# Patient Record
Sex: FEMALE | Race: WHITE | Employment: OTHER | URBAN - NONMETROPOLITAN AREA
[De-identification: names, ages, dates, MRNs, and addresses within clinical notes are randomized per-mention and may not be internally consistent; named-entity substitution may affect disease eponyms.]

---

## 2017-03-16 ENCOUNTER — OFFICE VISIT (OUTPATIENT)
Age: 82
End: 2017-03-16

## 2017-03-16 VITALS
DIASTOLIC BLOOD PRESSURE: 74 MMHG | TEMPERATURE: 98.2 F | BODY MASS INDEX: 21.61 KG/M2 | SYSTOLIC BLOOD PRESSURE: 120 MMHG | WEIGHT: 137.7 LBS | OXYGEN SATURATION: 92 % | HEIGHT: 67 IN | HEART RATE: 63 BPM | RESPIRATION RATE: 16 BRPM

## 2017-03-16 DIAGNOSIS — C18.2 MALIGNANT NEOPLASM OF ASCENDING COLON (HCC): Primary | ICD-10-CM

## 2017-03-16 PROCEDURE — 4040F PNEUMOC VAC/ADMIN/RCVD: CPT | Performed by: SURGERY

## 2017-03-16 PROCEDURE — G8484 FLU IMMUNIZE NO ADMIN: HCPCS | Performed by: SURGERY

## 2017-03-16 PROCEDURE — 1090F PRES/ABSN URINE INCON ASSESS: CPT | Performed by: SURGERY

## 2017-03-16 PROCEDURE — 1036F TOBACCO NON-USER: CPT | Performed by: SURGERY

## 2017-03-16 PROCEDURE — G8427 DOCREV CUR MEDS BY ELIG CLIN: HCPCS | Performed by: SURGERY

## 2017-03-16 PROCEDURE — 99213 OFFICE O/P EST LOW 20 MIN: CPT | Performed by: SURGERY

## 2017-03-16 PROCEDURE — G8419 CALC BMI OUT NRM PARAM NOF/U: HCPCS | Performed by: SURGERY

## 2017-03-16 PROCEDURE — G8400 PT W/DXA NO RESULTS DOC: HCPCS | Performed by: SURGERY

## 2017-03-16 PROCEDURE — 1123F ACP DISCUSS/DSCN MKR DOCD: CPT | Performed by: SURGERY

## 2021-10-12 ENCOUNTER — APPOINTMENT (OUTPATIENT)
Dept: CT IMAGING | Age: 86
DRG: 689 | End: 2021-10-12
Payer: MEDICARE

## 2021-10-12 ENCOUNTER — HOSPITAL ENCOUNTER (INPATIENT)
Age: 86
LOS: 1 days | Discharge: HOME | DRG: 689 | End: 2021-10-14
Attending: PEDIATRICS | Admitting: PEDIATRICS
Payer: MEDICARE

## 2021-10-12 ENCOUNTER — APPOINTMENT (OUTPATIENT)
Dept: GENERAL RADIOLOGY | Age: 86
DRG: 689 | End: 2021-10-12
Payer: MEDICARE

## 2021-10-12 DIAGNOSIS — R40.4 EPISODE OF UNRESPONSIVENESS: Primary | ICD-10-CM

## 2021-10-12 DIAGNOSIS — N30.00 ACUTE CYSTITIS WITHOUT HEMATURIA: ICD-10-CM

## 2021-10-12 DIAGNOSIS — T45.515A WARFARIN-INDUCED COAGULOPATHY (HCC): ICD-10-CM

## 2021-10-12 DIAGNOSIS — R41.89 EPISODE OF UNRESPONSIVENESS: Primary | ICD-10-CM

## 2021-10-12 DIAGNOSIS — D68.32 WARFARIN-INDUCED COAGULOPATHY (HCC): ICD-10-CM

## 2021-10-12 LAB
ALBUMIN SERPL-MCNC: 4.1 G/DL (ref 3.5–5.1)
ALP BLD-CCNC: 89 U/L (ref 38–126)
ALT SERPL-CCNC: 13 U/L (ref 11–66)
ANION GAP SERPL CALCULATED.3IONS-SCNC: 10 MEQ/L (ref 8–16)
APTT: 45.2 SECONDS (ref 22–38)
AST SERPL-CCNC: 19 U/L (ref 5–40)
BACTERIA: ABNORMAL /HPF
BASOPHILS # BLD: 0.7 %
BASOPHILS ABSOLUTE: 0.1 THOU/MM3 (ref 0–0.1)
BILIRUB SERPL-MCNC: 0.5 MG/DL (ref 0.3–1.2)
BILIRUBIN URINE: NEGATIVE
BLOOD, URINE: ABNORMAL
BUN BLDV-MCNC: 18 MG/DL (ref 7–22)
CALCIUM SERPL-MCNC: 9.2 MG/DL (ref 8.5–10.5)
CASTS 2: ABNORMAL /LPF
CASTS UA: ABNORMAL /LPF
CHARACTER, URINE: CLEAR
CHLORIDE BLD-SCNC: 105 MEQ/L (ref 98–111)
CO2: 26 MEQ/L (ref 23–33)
COLOR: YELLOW
CREAT SERPL-MCNC: 0.9 MG/DL (ref 0.4–1.2)
CRYSTALS, UA: ABNORMAL
EKG Q-T INTERVAL: 326 MS
EKG QRS DURATION: 82 MS
EKG QTC CALCULATION (BAZETT): 416 MS
EKG R AXIS: -75 DEGREES
EKG T AXIS: 65 DEGREES
EKG VENTRICULAR RATE: 98 BPM
EOSINOPHIL # BLD: 1.5 %
EOSINOPHILS ABSOLUTE: 0.1 THOU/MM3 (ref 0–0.4)
EPITHELIAL CELLS, UA: ABNORMAL /HPF
ERYTHROCYTE [DISTWIDTH] IN BLOOD BY AUTOMATED COUNT: 15.2 % (ref 11.5–14.5)
ERYTHROCYTE [DISTWIDTH] IN BLOOD BY AUTOMATED COUNT: 55.5 FL (ref 35–45)
GFR SERPL CREATININE-BSD FRML MDRD: 60 ML/MIN/1.73M2
GLUCOSE BLD-MCNC: 105 MG/DL (ref 70–108)
GLUCOSE URINE: NEGATIVE MG/DL
HCT VFR BLD CALC: 41 % (ref 37–47)
HEMOGLOBIN: 12.9 GM/DL (ref 12–16)
IMMATURE GRANS (ABS): 0.02 THOU/MM3 (ref 0–0.07)
IMMATURE GRANULOCYTES: 0.3 %
INR BLD: 3.13 (ref 0.85–1.13)
KETONES, URINE: NEGATIVE
LEUKOCYTE ESTERASE, URINE: ABNORMAL
LYMPHOCYTES # BLD: 22.8 %
LYMPHOCYTES ABSOLUTE: 1.7 THOU/MM3 (ref 1–4.8)
MCH RBC QN AUTO: 31.3 PG (ref 26–33)
MCHC RBC AUTO-ENTMCNC: 31.5 GM/DL (ref 32.2–35.5)
MCV RBC AUTO: 99.5 FL (ref 81–99)
MISCELLANEOUS 2: ABNORMAL
MONOCYTES # BLD: 9.9 %
MONOCYTES ABSOLUTE: 0.7 THOU/MM3 (ref 0.4–1.3)
NITRITE, URINE: POSITIVE
NUCLEATED RED BLOOD CELLS: 0 /100 WBC
OSMOLALITY CALCULATION: 283.5 MOSMOL/KG (ref 275–300)
PH UA: 5.5 (ref 5–9)
PLATELET # BLD: 161 THOU/MM3 (ref 130–400)
PMV BLD AUTO: 9.8 FL (ref 9.4–12.4)
POC CREATININE WHOLE BLOOD: 0.8 MG/DL (ref 0.5–1.2)
POTASSIUM REFLEX MAGNESIUM: 4.1 MEQ/L (ref 3.5–5.2)
PROTEIN UA: NEGATIVE
RBC # BLD: 4.12 MILL/MM3 (ref 4.2–5.4)
RBC URINE: ABNORMAL /HPF
RENAL EPITHELIAL, UA: ABNORMAL
SEG NEUTROPHILS: 64.8 %
SEGMENTED NEUTROPHILS ABSOLUTE COUNT: 4.8 THOU/MM3 (ref 1.8–7.7)
SODIUM BLD-SCNC: 141 MEQ/L (ref 135–145)
SPECIFIC GRAVITY, URINE: 1.03 (ref 1–1.03)
TOTAL PROTEIN: 7 G/DL (ref 6.1–8)
TROPONIN T: < 0.01 NG/ML
UROBILINOGEN, URINE: 0.2 EU/DL (ref 0–1)
WBC # BLD: 7.4 THOU/MM3 (ref 4.8–10.8)
WBC UA: ABNORMAL /HPF
YEAST: ABNORMAL

## 2021-10-12 PROCEDURE — 70496 CT ANGIOGRAPHY HEAD: CPT

## 2021-10-12 PROCEDURE — 93010 ELECTROCARDIOGRAM REPORT: CPT | Performed by: INTERNAL MEDICINE

## 2021-10-12 PROCEDURE — 36415 COLL VENOUS BLD VENIPUNCTURE: CPT

## 2021-10-12 PROCEDURE — 81001 URINALYSIS AUTO W/SCOPE: CPT

## 2021-10-12 PROCEDURE — 85730 THROMBOPLASTIN TIME PARTIAL: CPT

## 2021-10-12 PROCEDURE — 93005 ELECTROCARDIOGRAM TRACING: CPT | Performed by: EMERGENCY MEDICINE

## 2021-10-12 PROCEDURE — 6360000002 HC RX W HCPCS: Performed by: PHYSICIAN ASSISTANT

## 2021-10-12 PROCEDURE — 70498 CT ANGIOGRAPHY NECK: CPT

## 2021-10-12 PROCEDURE — 85610 PROTHROMBIN TIME: CPT

## 2021-10-12 PROCEDURE — 99222 1ST HOSP IP/OBS MODERATE 55: CPT | Performed by: PHYSICIAN ASSISTANT

## 2021-10-12 PROCEDURE — 87086 URINE CULTURE/COLONY COUNT: CPT

## 2021-10-12 PROCEDURE — 80053 COMPREHEN METABOLIC PANEL: CPT

## 2021-10-12 PROCEDURE — 6360000004 HC RX CONTRAST MEDICATION: Performed by: EMERGENCY MEDICINE

## 2021-10-12 PROCEDURE — 71045 X-RAY EXAM CHEST 1 VIEW: CPT

## 2021-10-12 PROCEDURE — 85025 COMPLETE CBC W/AUTO DIFF WBC: CPT

## 2021-10-12 PROCEDURE — 96365 THER/PROPH/DIAG IV INF INIT: CPT

## 2021-10-12 PROCEDURE — 99285 EMERGENCY DEPT VISIT HI MDM: CPT

## 2021-10-12 PROCEDURE — 84484 ASSAY OF TROPONIN QUANT: CPT

## 2021-10-12 PROCEDURE — 87186 SC STD MICRODIL/AGAR DIL: CPT

## 2021-10-12 PROCEDURE — 87077 CULTURE AEROBIC IDENTIFY: CPT

## 2021-10-12 PROCEDURE — 2580000003 HC RX 258: Performed by: PHYSICIAN ASSISTANT

## 2021-10-12 PROCEDURE — 82565 ASSAY OF CREATININE: CPT

## 2021-10-12 PROCEDURE — 70450 CT HEAD/BRAIN W/O DYE: CPT

## 2021-10-12 RX ADMIN — IOPAMIDOL 80 ML: 755 INJECTION, SOLUTION INTRAVENOUS at 21:09

## 2021-10-12 RX ADMIN — CEFTRIAXONE SODIUM 1000 MG: 1 INJECTION, POWDER, FOR SOLUTION INTRAMUSCULAR; INTRAVENOUS at 23:55

## 2021-10-12 ASSESSMENT — ENCOUNTER SYMPTOMS
SORE THROAT: 0
NAUSEA: 0
SHORTNESS OF BREATH: 0
ABDOMINAL PAIN: 0
RHINORRHEA: 0
COUGH: 0

## 2021-10-13 PROBLEM — R41.82 AMS (ALTERED MENTAL STATUS): Status: ACTIVE | Noted: 2021-10-13

## 2021-10-13 PROBLEM — N30.01 ACUTE CYSTITIS WITH HEMATURIA: Status: ACTIVE | Noted: 2021-10-13

## 2021-10-13 LAB
ALBUMIN SERPL-MCNC: 3.9 G/DL (ref 3.5–5.1)
ALP BLD-CCNC: 85 U/L (ref 38–126)
ALT SERPL-CCNC: 15 U/L (ref 11–66)
ANION GAP SERPL CALCULATED.3IONS-SCNC: 10 MEQ/L (ref 8–16)
AST SERPL-CCNC: 22 U/L (ref 5–40)
BILIRUB SERPL-MCNC: 0.6 MG/DL (ref 0.3–1.2)
BUN BLDV-MCNC: 15 MG/DL (ref 7–22)
CALCIUM SERPL-MCNC: 9 MG/DL (ref 8.5–10.5)
CHLORIDE BLD-SCNC: 105 MEQ/L (ref 98–111)
CO2: 28 MEQ/L (ref 23–33)
CREAT SERPL-MCNC: 0.7 MG/DL (ref 0.4–1.2)
ERYTHROCYTE [DISTWIDTH] IN BLOOD BY AUTOMATED COUNT: 15 % (ref 11.5–14.5)
ERYTHROCYTE [DISTWIDTH] IN BLOOD BY AUTOMATED COUNT: 55.7 FL (ref 35–45)
GFR SERPL CREATININE-BSD FRML MDRD: 80 ML/MIN/1.73M2
GLUCOSE BLD-MCNC: 95 MG/DL (ref 70–108)
HCT VFR BLD CALC: 39.9 % (ref 37–47)
HEMOGLOBIN: 12.5 GM/DL (ref 12–16)
INR BLD: 2.85 (ref 0.85–1.13)
MCH RBC QN AUTO: 31.4 PG (ref 26–33)
MCHC RBC AUTO-ENTMCNC: 31.3 GM/DL (ref 32.2–35.5)
MCV RBC AUTO: 100.3 FL (ref 81–99)
OSMOLALITY CALCULATION: 285.6 MOSMOL/KG (ref 275–300)
PLATELET # BLD: 148 THOU/MM3 (ref 130–400)
PMV BLD AUTO: 9.9 FL (ref 9.4–12.4)
POTASSIUM REFLEX MAGNESIUM: 4.2 MEQ/L (ref 3.5–5.2)
RBC # BLD: 3.98 MILL/MM3 (ref 4.2–5.4)
SODIUM BLD-SCNC: 143 MEQ/L (ref 135–145)
TOTAL PROTEIN: 6.3 G/DL (ref 6.1–8)
WBC # BLD: 6.7 THOU/MM3 (ref 4.8–10.8)

## 2021-10-13 PROCEDURE — 97530 THERAPEUTIC ACTIVITIES: CPT

## 2021-10-13 PROCEDURE — 85027 COMPLETE CBC AUTOMATED: CPT

## 2021-10-13 PROCEDURE — 2060000000 HC ICU INTERMEDIATE R&B

## 2021-10-13 PROCEDURE — 97535 SELF CARE MNGMENT TRAINING: CPT

## 2021-10-13 PROCEDURE — 99232 SBSQ HOSP IP/OBS MODERATE 35: CPT | Performed by: PHYSICIAN ASSISTANT

## 2021-10-13 PROCEDURE — 97116 GAIT TRAINING THERAPY: CPT

## 2021-10-13 PROCEDURE — 99233 SBSQ HOSP IP/OBS HIGH 50: CPT | Performed by: INTERNAL MEDICINE

## 2021-10-13 PROCEDURE — 6370000000 HC RX 637 (ALT 250 FOR IP): Performed by: STUDENT IN AN ORGANIZED HEALTH CARE EDUCATION/TRAINING PROGRAM

## 2021-10-13 PROCEDURE — 80053 COMPREHEN METABOLIC PANEL: CPT

## 2021-10-13 PROCEDURE — 85610 PROTHROMBIN TIME: CPT

## 2021-10-13 PROCEDURE — 97165 OT EVAL LOW COMPLEX 30 MIN: CPT

## 2021-10-13 PROCEDURE — 97162 PT EVAL MOD COMPLEX 30 MIN: CPT

## 2021-10-13 PROCEDURE — 92610 EVALUATE SWALLOWING FUNCTION: CPT

## 2021-10-13 PROCEDURE — 6360000002 HC RX W HCPCS: Performed by: STUDENT IN AN ORGANIZED HEALTH CARE EDUCATION/TRAINING PROGRAM

## 2021-10-13 PROCEDURE — 36415 COLL VENOUS BLD VENIPUNCTURE: CPT

## 2021-10-13 PROCEDURE — 2580000003 HC RX 258: Performed by: STUDENT IN AN ORGANIZED HEALTH CARE EDUCATION/TRAINING PROGRAM

## 2021-10-13 PROCEDURE — 6370000000 HC RX 637 (ALT 250 FOR IP)

## 2021-10-13 RX ORDER — SODIUM CHLORIDE 9 MG/ML
25 INJECTION, SOLUTION INTRAVENOUS PRN
Status: DISCONTINUED | OUTPATIENT
Start: 2021-10-13 | End: 2021-10-14 | Stop reason: HOSPADM

## 2021-10-13 RX ORDER — ASPIRIN 81 MG/1
81 TABLET ORAL DAILY
COMMUNITY

## 2021-10-13 RX ORDER — CETIRIZINE HYDROCHLORIDE 10 MG/1
10 TABLET ORAL NIGHTLY
Status: DISCONTINUED | OUTPATIENT
Start: 2021-10-13 | End: 2021-10-14 | Stop reason: HOSPADM

## 2021-10-13 RX ORDER — DIGOXIN 125 MCG
125 TABLET ORAL DAILY
Status: DISCONTINUED | OUTPATIENT
Start: 2021-10-13 | End: 2021-10-14 | Stop reason: HOSPADM

## 2021-10-13 RX ORDER — POTASSIUM CHLORIDE 750 MG/1
10 TABLET, EXTENDED RELEASE ORAL DAILY
COMMUNITY

## 2021-10-13 RX ORDER — WARFARIN SODIUM 3 MG/1
3 TABLET ORAL
Status: COMPLETED | OUTPATIENT
Start: 2021-10-13 | End: 2021-10-13

## 2021-10-13 RX ORDER — DIGOXIN 125 MCG
125 TABLET ORAL DAILY
COMMUNITY

## 2021-10-13 RX ORDER — POLYETHYLENE GLYCOL 3350 17 G/17G
17 POWDER, FOR SOLUTION ORAL DAILY PRN
Status: DISCONTINUED | OUTPATIENT
Start: 2021-10-13 | End: 2021-10-14 | Stop reason: HOSPADM

## 2021-10-13 RX ORDER — WARFARIN SODIUM 5 MG/1
TABLET ORAL
COMMUNITY

## 2021-10-13 RX ORDER — METOPROLOL SUCCINATE 25 MG/1
25 TABLET, EXTENDED RELEASE ORAL 2 TIMES DAILY
Status: DISCONTINUED | OUTPATIENT
Start: 2021-10-13 | End: 2021-10-14 | Stop reason: HOSPADM

## 2021-10-13 RX ORDER — SODIUM CHLORIDE 0.9 % (FLUSH) 0.9 %
5-40 SYRINGE (ML) INJECTION EVERY 12 HOURS SCHEDULED
Status: DISCONTINUED | OUTPATIENT
Start: 2021-10-13 | End: 2021-10-14 | Stop reason: HOSPADM

## 2021-10-13 RX ORDER — ACETAMINOPHEN 650 MG/1
650 SUPPOSITORY RECTAL EVERY 6 HOURS PRN
Status: DISCONTINUED | OUTPATIENT
Start: 2021-10-13 | End: 2021-10-14 | Stop reason: HOSPADM

## 2021-10-13 RX ORDER — ONDANSETRON 4 MG/1
4 TABLET, ORALLY DISINTEGRATING ORAL EVERY 8 HOURS PRN
Status: DISCONTINUED | OUTPATIENT
Start: 2021-10-13 | End: 2021-10-14 | Stop reason: HOSPADM

## 2021-10-13 RX ORDER — ESTRADIOL 0.1 MG/G
1 CREAM VAGINAL DAILY
COMMUNITY

## 2021-10-13 RX ORDER — ONDANSETRON 2 MG/ML
4 INJECTION INTRAMUSCULAR; INTRAVENOUS EVERY 6 HOURS PRN
Status: DISCONTINUED | OUTPATIENT
Start: 2021-10-13 | End: 2021-10-14 | Stop reason: HOSPADM

## 2021-10-13 RX ORDER — LANOLIN ALCOHOL/MO/W.PET/CERES
3 CREAM (GRAM) TOPICAL NIGHTLY
Status: DISCONTINUED | OUTPATIENT
Start: 2021-10-14 | End: 2021-10-14 | Stop reason: HOSPADM

## 2021-10-13 RX ORDER — WARFARIN SODIUM 2.5 MG/1
2.5 TABLET ORAL DAILY
Status: DISCONTINUED | OUTPATIENT
Start: 2021-10-13 | End: 2021-10-13 | Stop reason: DRUGHIGH

## 2021-10-13 RX ORDER — OMEPRAZOLE 20 MG/1
20 CAPSULE, DELAYED RELEASE ORAL DAILY
COMMUNITY

## 2021-10-13 RX ORDER — SODIUM CHLORIDE 0.9 % (FLUSH) 0.9 %
5-40 SYRINGE (ML) INJECTION PRN
Status: DISCONTINUED | OUTPATIENT
Start: 2021-10-13 | End: 2021-10-14 | Stop reason: HOSPADM

## 2021-10-13 RX ORDER — FUROSEMIDE 20 MG/1
20 TABLET ORAL DAILY
COMMUNITY

## 2021-10-13 RX ORDER — DIAPER,BRIEF,INFANT-TODD,DISP
EACH MISCELLANEOUS DAILY
COMMUNITY

## 2021-10-13 RX ORDER — CYANOCOBALAMIN 1000 UG/ML
1000 INJECTION INTRAMUSCULAR; SUBCUTANEOUS
COMMUNITY

## 2021-10-13 RX ORDER — METOPROLOL SUCCINATE 100 MG/1
100 TABLET, EXTENDED RELEASE ORAL DAILY
Status: ON HOLD | COMMUNITY
End: 2021-10-14 | Stop reason: HOSPADM

## 2021-10-13 RX ORDER — ATORVASTATIN CALCIUM 10 MG/1
10 TABLET, FILM COATED ORAL DAILY
Status: DISCONTINUED | OUTPATIENT
Start: 2021-10-13 | End: 2021-10-14 | Stop reason: HOSPADM

## 2021-10-13 RX ORDER — ACETAMINOPHEN 325 MG/1
650 TABLET ORAL EVERY 6 HOURS PRN
Status: DISCONTINUED | OUTPATIENT
Start: 2021-10-13 | End: 2021-10-14 | Stop reason: HOSPADM

## 2021-10-13 RX ADMIN — SODIUM CHLORIDE, PRESERVATIVE FREE 10 ML: 5 INJECTION INTRAVENOUS at 09:32

## 2021-10-13 RX ADMIN — ATORVASTATIN CALCIUM 10 MG: 10 TABLET, FILM COATED ORAL at 19:30

## 2021-10-13 RX ADMIN — CEFTRIAXONE SODIUM 1000 MG: 1 INJECTION, POWDER, FOR SOLUTION INTRAMUSCULAR; INTRAVENOUS at 23:08

## 2021-10-13 RX ADMIN — METOPROLOL SUCCINATE 25 MG: 25 TABLET, FILM COATED, EXTENDED RELEASE ORAL at 09:32

## 2021-10-13 RX ADMIN — WARFARIN SODIUM 3 MG: 3 TABLET ORAL at 19:28

## 2021-10-13 RX ADMIN — METOPROLOL SUCCINATE 25 MG: 25 TABLET, FILM COATED, EXTENDED RELEASE ORAL at 19:30

## 2021-10-13 RX ADMIN — SODIUM CHLORIDE, PRESERVATIVE FREE 10 ML: 5 INJECTION INTRAVENOUS at 21:06

## 2021-10-13 RX ADMIN — DIGOXIN 125 MCG: 125 TABLET ORAL at 09:32

## 2021-10-13 RX ADMIN — CETIRIZINE HYDROCHLORIDE 10 MG: 10 TABLET, FILM COATED ORAL at 19:29

## 2021-10-13 ASSESSMENT — ENCOUNTER SYMPTOMS
COUGH: 0
SHORTNESS OF BREATH: 0
VOMITING: 0
ABDOMINAL PAIN: 0
RHINORRHEA: 0
PHOTOPHOBIA: 0
SORE THROAT: 0
DIARRHEA: 0
NAUSEA: 0

## 2021-10-13 ASSESSMENT — PAIN SCALES - GENERAL
PAINLEVEL_OUTOF10: 0

## 2021-10-13 NOTE — CARE COORDINATION
10/13/21, 7:40 AM EDT  DISCHARGE PLANNING EVALUATION:    Irvin Catherine       Admitted: 10/12/2021/ 2056   Hospital day: 0   Location: 4A-16/016-A Reason for admit: Acute cystitis without hematuria [N30.00]  Episode of unresponsiveness [R41.89]  AMS (altered mental status) [R41.82]   PMH:  has a past medical history of AR (allergic rhinitis), Atrial fibrillation ([facility] Cancer ([facility] Cancer ([facility] GERD (gastroesophageal reflux disease), History of blood transfusion, Hx of blood clots, Hyperlipidemia, Hypertension, MI, old, and Unspecified cerebral artery occlusion with cerebral infarction. Procedure:   CT Head WO Contrast   Impression:        1. No mass effect or acute hemorrhage. 2. Chronic periventricular small vessel ischemic changes and cerebral atrophy. 3. Right-sided encephalomalacia, likely secondary to prior infarct. If there is clinical concern for an evolving infarct, brain MRI is recommended for further evaluation. EEG    Barriers to Discharge:  From ED. Neurology consult, neuro checks, PT/OT/ST, seizure precautions, telemetry, Pharmacy dosing Coumadin. PCP: Michelle Marvin MD  Readmission Risk Score: 14%    Patient Goals/Plan/Treatment Preferences: Betzaida Croft currently resides at [facility]. Plan is to return at discharge. SW following. Transportation/Food Security/Housekeeping Addressed:  No issues identified.

## 2021-10-13 NOTE — PROGRESS NOTES
Physician Progress Note      PATIENT:               Marilin Martinez  CSN #:                  [id number]  :                       1935  ADMIT DATE:       10/12/2021 8:56 PM  [address] [place] DATE:  RESPONDING  PROVIDER #:        Herminia Valdes MD          QUERY TEXT:    Pt admitted with UTI and noted to have altered mentation. If possible, please   document in progress notes and discharge summary the relationship, if any,   between altered mentation and the following: The medical record reflects the following:  Risk Factors: UTI  Clinical Indicators: unreponsive for about an hour, imaging negative for acute   process, no history of seizure per notes, improved in mentation in ED  Treatment: Labs, urinalysis, urine culture, imaging, code stroke, neurology   consult  Options provided:  -- Altered mentation due to metabolic encephalopathy  -- Altered mentation due to seizure with post ictal state  -- Altered mentation due to previous stroke (sequelae of CVA)  -- Altered mentation due to dementia  -- Altered mentation unrelated to seizure or encephalopathy  -- Other - I will add my own diagnosis  -- Disagree - Not applicable / Not valid  -- Disagree - Clinically unable to determine / Unknown  -- Refer to Clinical Documentation Reviewer    PROVIDER RESPONSE TEXT:    This patient had altered mentation due to metabolic encephalopathy.     Query created by: Lizzie Tolentino on 10/13/2021 8:16 AM      Electronically signed by:  Herminia Valdes MD   10/13/2021 12:12 PM

## 2021-10-13 NOTE — PROGRESS NOTES
[address] [address]  INPATIENT PHYSICAL THERAPY  EVALUATION  [facility] 4A - 4A-16/016-A    Time In: 8314  Time Out: 1001  Timed Code Treatment Minutes: 15 Minutes  Minutes: 25          Date: 10/13/2021  Patient Name: Denzel Councilman,  Gender:  female        MRN: [id number]  : 1935  (82 y.o.)      Referring Practitioner: Bj Painter MD  Diagnosis: acute cystitis with hematuria  Additional Pertinent Hx: Per chart \"Kristi Benoit is a 82 y.o. female with PMH of Alzheimer's dementia compounded by vascular dementia, persistent A. fib chronically on Coumadin, colon cancer in 2015 treated surgically, HTN, HLD. Patient resides at a NH. Accompanied by daughter at bedside. Per patient's daughter, earlier this evening while the patient was talking with other women/friends, she had all of a sudden started to Blu off\" into space and was nonresponsive to verbal command. Furthermore, patient's daughter states that her eyes looked dull like. EMS was contacted immediately. On arrival to our facility patient was initially unresponsive and not making pure purposeful movements. No seizure-like activity was noted by staff. No history of bladder or bowel incontinence. No history of seizure disorder. However, patient's daughter states that she has had episodes in the past where she would seem to stare off/zone out but only for a minute or so before coming back. Patient apparently also has a UTI in which the nursing home was delaying antibiotic treatment until culture reports came back. Patient had undergone immediate CT head with no acute mass-effect or hemorrhage. CTA head and neck revealed no aneurysm or dissection. Neurology was consulted immediately. It was noted on chart review that the patient's mental status had begun to improve and she became more alert and responsive. On my personal assessment, patient seems alert but is not totally oriented. She states currently that she is in no pain.  She does however admit that she has been urinating more often than usual lately. She denies any current headache, presyncope, chest pain, palpitations, abdominal pain, shortness of breath, cough, N/V/C/D, extremity weakness or pain. Patient required frequent reorientation by her daughter in the room. \"     Restrictions/Precautions:  Restrictions/Precautions: Fall Risk, General Precautions, Seizure    Subjective:  Chart Reviewed: Yes  Patient assessed for rehabilitation services?: Yes  Family / Caregiver Present: Yes (daughter)  Subjective: OK to see pt per nursing. Pt sitting on EOB when therapy arrived, daughter present. Pt pleasant and cooperative during session. General:  Overall Orientation Status: Impaired (dementia)  Orientation Level: Disoriented to person, Disoriented to place, Disoriented to time, Disoriented to situation  Follows Commands: Within Functional Limits    Vision: Impaired  Vision Exceptions: Wears glasses at all times    Hearing: Within functional limits         Pain: denies    Vitals: Vitals not assessed per clinical judgement, see nursing flowsheet    Social/Functional History:    Lives With: Alone  Type of Home: Facility  Home Access: Level entry  Home Equipment: 4 wheeled walker     Bathroom Shower/Tub: Walk-in shower  Bathroom Toilet: Handicap height  Bathroom Accessibility: Accessible    Receives Help From: Other (comment) (facility staff)  ADL Assistance: Needs assistance  Homemaking Assistance: Needs assistance  Homemaking Responsibilities: No  Ambulation Assistance: Needs assistance  Transfer Assistance: Needs assistance    Active : No     Additional Comments: Pt amb with 4ww for >500 feet with mobility. Pt required assist from staff for ADL and IADL tasks. Pt was previously in therapy at nursing home.     OBJECTIVE:  Range of Motion:  Bilateral Lower Extremity: WNL    Strength:  Bilateral Lower Extremity: [place] 4/5    Balance:  Static Sitting Balance:  Supervision, X 1, with cues for safety, with verbal cues   Static Standing Balance: Stand By Assistance, Contact Guard Assistance, X 1, with cues for safety, with verbal cues   Use of RW for support in standing, no LOB noted with completion, fair safety due to reduced cognition  Bed Mobility:  Not Tested    Transfers:  Sit to Stand: Minimal Assistance, X 1, cues for hand placement, with verbal cues  Stand to Sit:Contact Guard Assistance, Minimal Assistance, X 1, cues for hand placement, with verbal cues  RW for support with standing, fair safety noted, cues for improved technique with fair demo and reduced carryover noted  Ambulation:  Stand By Assistance, [employer] Assistance, X 1, with cues for safety, with verbal cues , with increased time for completion  Distance: 220 feet  Surface: Level Tile  Device:Rolling Walker  Gait Deviations: Forward Flexed Posture, Slow Danelle, Decreased Step Length Bilaterally, Decreased Gait Speed and Mild Path Deviations  Cues for safety with use of RW for support, cues for directional changes with increased distraction noted, cues to maintain on task during session, fair demo    Functional Outcome Measures: Completed  AM-PAC Inpatient Mobility Raw Score : 17  AM-PAC Inpatient T-Scale Score : 42.13    ASSESSMENT:  Activity Tolerance:  Patient tolerance of  treatment: good. Treatment Initiated: Treatment and education initiated within context of evaluation. Evaluation time included review of current medical information, gathering information related to past medical, social and functional history, completion of standardized testing, formal and informal observation of tasks, assessment of data and development of plan of care and goals. Treatment time included skilled education and facilitation of tasks to increase safety and independence with functional mobility for improved independence and quality of life. Assessment:   Body structures, Functions, Activity limitations: Decreased functional mobility , Decreased balance, Decreased posture, Decreased strength, Decreased safe awareness, Decreased cognition, Decreased endurance  Assessment: pt cont to require skilled PT services to increase strength, progress with balance and endurance to progress with gait tasks and progress towards PLOF to increase safety and decrease risk for falls. Prognosis: Good    REQUIRES PT FOLLOW UP: Yes    Discharge Recommendations:  Discharge Recommendations: Continue to assess pending progress, ECF with PT, Patient would benefit from continued therapy after discharge    Patient Education:  PT Education: Goals, General Safety, Gait Training, PT Role, Plan of Care, Transfer Training, Equipment, Functional Mobility Training  Barriers to Learning: cognition deficits    Equipment Recommendations:  Equipment Needed: No    Plan:  Times per week: 6x N  Current Treatment Recommendations: Strengthening, Neuromuscular Re-education, Home Exercise Program, Safety Education & Training, Balance Training, Endurance Training, Patient/Caregiver Education & Training, Functional Mobility Training, Equipment Evaluation, Education, & procurement, Transfer Training, Gait Training, Positioning    Goals:  Patient goals : back to nursing home with therapy services  Short term goals  Time Frame for Short term goals: by discharge  Short term goal 1: Pt will amb with RW with S for safety for >300 feet to progress towards PLOF. Short term goal 2: Pt will demo S for transfer with improved safety with RW for support to progress with mobility. Short term goal 3: Pt will complete 10-20 reps of ther ex to increase overall mobility. Long term goals  Time Frame for Long term goals : NA due to short ELOS    Following session, patient left in safe position with all fall risk precautions in place. Pt sitting in bedside chair following session with chair alarm on and all needs and call light in reach, family present.

## 2021-10-13 NOTE — PROGRESS NOTES
Clinical Pharmacy Note    Sharee Dixon is a 82 y.o. female for whom pharmacy has been asked to manage warfarin therapy. Reason for Admission: unresponsive episode/AMS    Consulting Physician: Dr. Disha Sims  Warfarin dose prior to admission: 2.5 mg MWFSat, 3 mg TuThSun. Warfarin indication: AF  Target INR range: 2.0-3.0   Outpatient warfarin provider: F Provider    Past Medical History:   Diagnosis Date    AR (allergic rhinitis)     [facility] ([facility]     [facility]     skin    Cancer ([facility] 2015    colon  (surgery - no chemo or radiation)    GERD (gastroesophageal reflux disease)     History of blood transfusion at age 38 years    before hysterectomy    Hx of blood clots 2006??    brain    Hyperlipidemia     Hypertension     MI, old     Unspecified cerebral artery occlusion with cerebral infarction 2006    no weakness - left eye deficit (vision)              Recent Labs     10/13/21  0814   INR 2.85*     Recent Labs     10/12/21  2118 10/13/21  0422   HGB 12.9 12.5   HCT 41.0 39.9    148     Current warfarin drug-drug interactions: lovenox 40mg SC daily    Date INR Warfarin Dose   10/13/21 2.85 3mg                                   PT/INR or POC-INR will be drawn tomorrow 10/14/21 monitored routinely until therapeutic INR is achieved. Lovenox discontinued. Thank you for the consult.       Crystal Small, DavidD

## 2021-10-13 NOTE — CONSULTS
Neurology Stroke Alert Note    Date:10/12/2021       Room:[address] [place] [address]  Patient Name:Kristi Mackay     YOB: 1935     Age:86 y.o. Requesting Physician: Karel Augustin PA-C     Reason for Consult:  Evaluate for stroke alert    HPI: Sunni Holland who is a 82 y.o. female with a history of Alzheimer's dementia, atrial fibrillation currently on Coumadin, and previous ischemic stroke who presents as a stroke alert activation. Per the patient's daughter who is present at bedside, the patient had a unresponsive episode for approximately 1 hour. She saw the patient earlier in the day and the patient was doing well. However she was later contacted by the nursing staff at the patient's facility who informed her that the patient was unresponsive. Upon arrival to the facility, the patient was unresponsive and was not making any purposeful movements. There was no seizure-like activity noted, but the daughter does state that the patient's right arm was cupped. There is no bowel or bladder incontinence. She has no history of seizure disorder but has had unresponsive episodes in the past.  According to her daughter, she has a urinary tract infection but the nursing home was waiting on culture to begin antibiotics. The patient was taken promptly to CT scan where she had a noncontrast CT of the head and a CTA of the head and neck which revealed no acute abnormalities. Over the patient's course in the emergency department she became increasingly alert and oriented. Initially, she was unable to follow simple commands, but over time was more responsive. Time of symptoms onset/last time seen at baseline: 8 PM this evening. Time of stroke alert: 8:54 PM.  Time of Neurology arrival: 8:55 PM.  Vascular risk factors:  Atrial fibrillation, hyperlipidemia, hypertension  Initial Glucose: 98.  Old deficits from prior stroke: None. INR in ED if anticoagulated: 3.13.  BP in ED: 149/90.   Initial NIHSS: 1. Modified Bent Score upon admission: 3. IV tPA administerd: Not indicated. Time of Initial Imaging Read: 9:12 PM  Thrombectomy performed: Not indicated. Puncture time: Not indicated. Review of Systems   Review of Systems   Constitutional: Positive for chills (cold all the time). Negative for fever. HENT: Negative for rhinorrhea and sore throat. Eyes: Negative for visual disturbance. Respiratory: Negative for cough and shortness of breath. Cardiovascular: Negative for chest pain and palpitations. Gastrointestinal: Negative for abdominal pain and nausea. Genitourinary: Negative for dysuria. Musculoskeletal: Positive for arthralgias. Skin: Negative for rash. Neurological: Negative for dizziness, seizures, facial asymmetry, speech difficulty, weakness and headaches. Psychiatric/Behavioral: Positive for behavioral problems. Medications   Scheduled Meds:   Continuous Infusions:   PRN Meds:     Past History    Past Medical History:   has a past medical history of AR (allergic rhinitis), Atrial fibrillation ([address] Cancer ([facility] Cancer ([facility] GERD (gastroesophageal reflux disease), History of blood transfusion, Hx of blood clots, Hyperlipidemia, Hypertension, MI, old, and Unspecified cerebral artery occlusion with cerebral infarction. Social History:   reports that she quit smoking about 51 years ago. Her smoking use included cigarettes. She smoked 0.25 packs per day. She has never used smokeless tobacco. She reports that she does not drink alcohol and does not use drugs.      Family History:   Family History   Problem Relation Age of Onset   Theodoro Milder Stroke Mother     Arthritis Father     Cancer Sister         ovarian    Cancer Brother         pancreatic    Diabetes Sister     Arthritis Sister     Heart Disease Sister        Physical Examination        NIH Stroke Scale  1a Level of consciousness 0=alert; keenly responsive   1b LOC questions 1=Performs one task correctly   1c LOC commands 0=Performs both tasks correctly   2 Best Gaze 0=normal   3 Visual 0=No visual loss   4 Facial Palsy 0=Normal symmetric movement   5a Motor left arm 0=No drift, limb holds 90 (or 45) degrees for full 10 seconds   5b Motor right arm 0=No drift, limb holds 90 (or 45) degrees for full 10 seconds   6a Motor left leg 0=No drift, limb holds 90 (or 45) degrees for full 10 seconds   6b Motor right leg 0=No drift, limb holds 90 (or 45) degrees for full 10 seconds   7 Limb Ataxia 0=Absent   8 Sensory 0=Normal; no sensory loss   9 Best Language 0=No aphasia, normal   10 Dysarthria 0=Normal   11 Extinction and Inattention 0=No abnormality   TOTAL  1   Time NIHSS performed: 9:55 PM    Vitals:  BP (!) 149/90   Pulse 82   Resp 16   SpO2 96%   No data recorded. I/O (24Hr): No intake or output data in the 24 hours ending 10/12/21 2155    Physical Exam  Vitals reviewed. Constitutional:       General: She is not in acute distress. Appearance: Normal appearance. She is not ill-appearing. HENT:      Head: Normocephalic and atraumatic. Right Ear: External ear normal.      Left Ear: External ear normal.      Nose: Nose normal.      Mouth/Throat:      Mouth: Mucous membranes are moist.      Pharynx: No oropharyngeal exudate or posterior oropharyngeal erythema. Eyes:      Extraocular Movements: EOM normal.      Pupils: Pupils are equal, round, and reactive to light. Cardiovascular:      Rate and Rhythm: Tachycardia present. Rhythm irregular. Heart sounds: Normal heart sounds. No murmur heard. Pulmonary:      Effort: Pulmonary effort is normal. No respiratory distress. Breath sounds: Normal breath sounds. No wheezing. Abdominal:      General: Bowel sounds are normal.      Palpations: Abdomen is soft. Tenderness: There is no abdominal tenderness. Musculoskeletal:         General: Normal range of motion. Right lower leg: No edema. Left lower leg: No edema.    Skin:     General: Skin is warm. Findings: No rash. Neurological:      Mental Status: She is alert and oriented to person, place, and time. Coordination: Finger-Nose-Finger Test and Heel to [facility] Test normal.      Deep Tendon Reflexes:      Reflex Scores:       Tricep reflexes are 2+ on the right side and 2+ on the left side. Bicep reflexes are 2+ on the right side and 2+ on the left side. Brachioradialis reflexes are 2+ on the right side and 2+ on the left side. Patellar reflexes are 2+ on the right side and 2+ on the left side. Achilles reflexes are 2+ on the right side and 2+ on the left side. Psychiatric:         Mood and Affect: Mood normal.         Speech: Speech normal.         Behavior: Behavior normal.       Neurologic Exam     Mental Status   Oriented to person, place, and time. Attention: normal. Concentration: normal.   Speech: speech is normal   Level of consciousness: alert  Normal comprehension. Patient does not know age     Cranial Nerves     CN II   Visual fields full to confrontation. Right visual field deficit: none  Left visual field deficit: none     CN III, IV, VI   Pupils are equal, round, and reactive to light. Extraocular motions are normal.   Right pupil: Shape: regular. Reactivity: brisk. Left pupil: Shape: regular. Reactivity: brisk. CN V   Facial sensation intact. Right facial sensation deficit: none  Left facial sensation deficit: none    CN VII   Facial expression full, symmetric.    Right facial weakness: none  Left facial weakness: none    CN VIII   CN VIII normal.   Hearing: intact    CN IX, X   CN IX normal.   CN X normal.   Palate: symmetric    CN XI   CN XI normal.   Right trapezius strength: normal  Left trapezius strength: normal    CN XII   CN XII normal.   Tongue: not atrophic  Fasciculations: absent  Tongue deviation: none    Motor Exam   Muscle bulk: normal  Overall muscle tone: normal  Right arm tone: normal  Left arm tone: normal  Right arm pronator drift: absent  Left arm pronator drift: absent  Right leg tone: normal  Left leg tone: normal  Muscle strength 5/5 in bilateral upper and lower extremities     Sensory Exam   Light touch normal.     Gait, Coordination, and Reflexes     Coordination   Finger to nose coordination: normal  Heel to shin coordination: normal    Reflexes   Right brachioradialis: 2+  Left brachioradialis: 2+  Right biceps: 2+  Left biceps: 2+  Right triceps: 2+  Left triceps: 2+  Right patellar: 2+  Left patellar: 2+  Right achilles: 2+  Left achilles: 2+       Labs/Imaging/Diagnostics   Labs:  CBC:  Recent Labs     10/12/21  2118   WBC 7.4   RBC 4.12*   HGB 12.9   HCT 41.0   MCV 99.5*        CHEMISTRIES:  Recent Labs     10/12/21  2118      K 4.1      CO2 26   BUN 18   CREATININE 0.9   GLUCOSE 105     PT/INR:  Recent Labs     10/12/21  2118   INR 3.13*     APTT:  Recent Labs     10/12/21  2118   APTT 45.2*     LIVER PROFILE:  Recent Labs     10/12/21  2118   AST 19   ALT 13   BILITOT 0.5   ALKPHOS 89       Imaging Last 24 Hours:  CT HEAD WO CONTRAST    Result Date: 10/12/2021  PROCEDURE: CT HEAD WO CONTRAST CLINICAL INFORMATION: Cerebrovascular accident TECHNIQUE: CT scan of the head was performed from the vertex to the skull base without use of intravenous contrast. Axial images as well as coronal and sagittal reconstructions were obtained. All CT scans at this facility use dose modulation, iterative reconstruction, and/or weight-based dosing when appropriate to reduce radiation dose to as low as reasonably achievable. COMPARISON: None. FINDINGS: There is no mass effect, midline shift or acute hemorrhage. Ventricles and sulci are prominent. There is diffuse periventricular white matter hypoattenuation. There is an area of encephalomalacia extending from the right parietal and temporal lobes into the right occipital lobe. Visualized orbits, paranasal sinuses and mastoid air cells are unremarkable.      1. No mass effect or acute hemorrhage. 2. Chronic periventricular small vessel ischemic changes and cerebral atrophy. 3. Right-sided encephalomalacia, likely secondary to prior infarct. If there is clinical concern for an evolving infarct, brain MRI is recommended for further evaluation. **This report has been created using voice recognition software. It may contain minor errors which are inherent in voice recognition technology. ** Final report electronically signed by Dr. Loni Hsu on 10/12/2021 9:12 PM      Assessment and Plan:          1. Unresponsive, stroke alert  · CT and CTA head and neck negative for any acute findings. Patient was not a candidate for TPA due to lack of deficit and not a candidate for endovascular intervention due to lack of deficit and lack of LVO was seen on CT angiogram  · During her ER course, her mentation improved and she became more alert and responsive. Her history of 1 hour episode of unresponsiveness is concerning for seizure-like activity. At this time, we would recommend inpatient admission with EEG to evaluate for seizure-like activity given this episode and history of prior episodes.     Electronically signed by Kelly Ramirez PA-C on 10/12/21 at 10:08 PM EDT

## 2021-10-13 NOTE — DISCHARGE INSTR - COC
Continuity of Care Form    Patient Name: Louann Landau   :  1935  MRN:  [id number]    Admit date:  10/12/2021  Discharge date:  ***    Code Status Order: DNR-CC   Advance Directives:     Admitting Physician:  Loy Tang MD  PCP: Art Vitale MD    Discharging Nurse: [facility] Unit/Room#: 4A-16/016-A  Discharging Unit Phone Number: ***    Emergency Contact:   Extended Emergency Contact Information  Primary Emergency Contact: Samuel Ross  Work Phone: [phone]  Relation: Child   needed? No    Past Surgical History:  Past Surgical History:   Procedure Laterality Date    APPENDECTOMY      at age 23 years? ?    CARDIAC CATHETERIZATION  ??    x2,  OK    COLECTOMY  2015    Robotic Assisted Laparoscopic--Dr. Castillo Monday    COLONOSCOPY  ??    DENTAL SURGERY  around age 30 Years? ??    full mouth extraction    DILATION AND CURETTAGE OF UTERUS      before hysterectomy    ENDOSCOPY, COLON, DIAGNOSTIC      EYE SURGERY  ? ??    cataracts, bilateral    HYSTERECTOMY      at age 38 years    MOHS SURGERY  2017    nose    PRE-MALIGNANT / BENIGN SKIN LESION EXCISION  2017    chest    SKIN BIOPSY         Immunization History:   Immunization History   Administered Date(s) Administered    COVID-19, Yudith Bran, PF, 100mcg/0.5mL 2021, 2021       Active Problems:  Patient Active Problem List   Diagnosis Code    Colon cancer ([facility] [address] C18.9    Atrial fibrillation ([facility] [address] I48.91    History of CVA (cerebrovascular accident) Z86.73    Acute cystitis with hematuria N30.01       Isolation/Infection:   Isolation          No Isolation        Patient Infection Status     None to display          Nurse Assessment:  Last Vital Signs: BP (!) 104/58   Pulse 79   Temp 98.1 °F (36.7 °C) (Oral)   Resp 18   Ht 5' 5\" (1.651 m)   Wt 145 lb 3.2 oz (65.9 kg)   SpO2 97%   BMI 24.16 kg/m²     Last documented pain score (0-10 scale): Pain Level: 0  Last Weight:    Wt Readings from Last 1 Encounters:   10/13/21 145 lb 3.2 oz (65.9 kg)     Mental Status:  {IP PT MENTAL STATUS:}    IV Access:  { SAIGE IV ACCESS:315922275}    Nursing Mobility/ADLs:  Walking   {CHP DME HVA}  Transfer  {CHP DME DCHF:570365165}  Bathing  {CHP DME LFCC:126918539}  Dressing  {CHP DME TAO}  Toileting  {CHP DME EFOW:214903825}  Feeding  {CHP DME AGRW:482515268}  Med Admin  {CHP DME HZEU:220057729}  Med Delivery   { SAIGE MED Delivery:826807534}    Wound Care Documentation and Therapy:        Elimination:  Continence:   · Bowel: {YES / HV:93207}  · Bladder: {YES / QC:41744}  Urinary Catheter: {Urinary Catheter:181238793}   Colostomy/Ileostomy/Ileal Conduit: {YES / GY:42386}       Date of Last BM: ***    Intake/Output Summary (Last 24 hours) at 10/13/2021 1205  Last data filed at 10/13/2021 0932  Gross per 24 hour   Intake 20 ml   Output --   Net 20 ml     I/O last 3 completed shifts:   In: 10 [P.O.:10]  Out: -     Safety Concerns:     [address] Safety Concerns:051084727}    Impairments/Disabilities:      [address] Impairments/Disabilities:882232236}    Nutrition Therapy:  Current Nutrition Therapy:   [address] Diet List:219791349}    Routes of Feeding: {P DME Other Feedings:970965505}  Liquids: {Slp liquid thickness:66498}  Daily Fluid Restriction: {CHP DME Yes amt example:859652034}  Last Modified Barium Swallow with Video (Video Swallowing Test): {Done Not Done TXE}    Treatments at the Time of Hospital Discharge:   Respiratory Treatments: ***  Oxygen Therapy:  {Therapy; copd oxygen:82907}  Ventilator:    {[facility] Vent UUAP:596842929}    Rehab Therapies: {THERAPEUTIC INTERVENTION:7124916799}  Weight Bearing Status/Restrictions: [address]  Weight Bearin}  Other Medical Equipment (for information only, NOT a DME order):  {EQUIPMENT:719128689}  Other Treatments: ***    Patient's personal belongings (please select all that are sent with patient):  {[facility] DME Belongings:513028286}    RN SIGNATURE:  {Esignature:322242408}    CASE MANAGEMENT/SOCIAL WORK SECTION    Inpatient Status Date: 10-    Readmission Risk Assessment Score:  Readmission Risk              Risk of Unplanned Readmission:  14           Discharging to Facility/ Agency   · Name: [facility].  · Address: [address] [place] [address] [facility]fo Burns[facility]  · Phone: [phone]  · Fax: [phone]    Dialysis Facility (if applicable)   · Name:  · Address:  · Dialysis Schedule:  · Phone:  · Fax:    / signature: Electronically signed by EUNICE Rondon on 10/13/21 at 12:07 PM EDT    PHYSICIAN SECTION    Prognosis: {Prognosis:7375805274}    Condition at Discharge: [address] Patient Condition:942325116}    Rehab Potential (if transferring to Rehab): {Prognosis:5072773329}    Recommended Labs or Other Treatments After Discharge: ***    Physician Certification: I certify the above information and transfer of Louann Landau  is necessary for the continuing treatment of the diagnosis listed and that she requires {Admit to Appropriate Level of Care:26164} for {GREATER/LESS:621880409} 30 days.      Update Admission H&P: {CHP DME Changes in FBFYB:612352428}    PHYSICIAN SIGNATURE:  {Esignature:400751700}

## 2021-10-13 NOTE — PROGRESS NOTES
Layla Allen 62  INPATIENT OCCUPATIONAL THERAPY  [facility] NEUROSCIENCES 4A  EVALUATION    Time:   Time In:   Time Out: 1131  Timed Code Treatment Minutes: 24 Minutes  Minutes: 34    Date: 10/13/2021  Patient Name: Faith Hernandez,   Gender: female      MRN: [id number]  : 1935  (82 y.o.)  Referring Practitioner: Angel Estrada MD  Diagnosis: Acute cystitis without hematuria  Additional Pertinent Hx: Per chart \"Kristi Condon is a 82 y.o. female with PMH of Alzheimer's dementia compounded by vascular dementia, persistent A. fib chronically on Coumadin, colon cancer in 2015 treated surgically, HTN, HLD. Patient resides at a NH. Accompanied by daughter at bedside. Per patient's daughter, earlier this evening while the patient was talking with other women/friends, she had all of a sudden started to Blu off\" into space and was nonresponsive to verbal command. Furthermore, patient's daughter states that her eyes looked dull like. EMS was contacted immediately. On arrival to our facility patient was initially unresponsive and not making pure purposeful movements. No seizure-like activity was noted by staff. No history of bladder or bowel incontinence. No history of seizure disorder. However, patient's daughter states that she has had episodes in the past where she would seem to stare off/zone out but only for a minute or so before coming back. Patient apparently also has a UTI in which the nursing home was delaying antibiotic treatment until culture reports came back. Patient had undergone immediate CT head with no acute mass-effect or hemorrhage. CTA head and neck revealed no aneurysm or dissection. Neurology was consulted immediately. It was noted on chart review that the patient's mental status had begun to improve and she became more alert and responsive. On my personal assessment, patient seems alert but is not totally oriented. She states currently that she is in no pain.  She does however admit that she has been urinating more often than usual lately. She denies any current headache, presyncope, chest pain, palpitations, abdominal pain, shortness of breath, cough, N/V/C/D, extremity weakness or pain. Patient required frequent reorientation by her daughter in the room. \"    Restrictions/Precautions:  Restrictions/Precautions: Fall Risk, General Precautions, Seizure    Subjective  Chart Reviewed: Yes, Orders, Progress Notes, History and Physical  Patient assessed for rehabilitation services?: Yes  Family / Caregiver Present: Yes (daughter present throughout evaluation)    Subjective: RN approved OT session. Upon arrival daughter reports pt was beginning to speak about getting ready to leave. Frequent orientation required throughout session. Pt pleasant and cooperative. Pain:  Pain Assessment  Patient Currently in Pain: Denies    Vitals: Vitals not assessed per clinical judgement. Social/Functional History:  Lives With: Alone  Type of Home: Facility  Home Access: Level entry  Home Equipment: 4 wheeled walker   Bathroom Shower/Tub: Walk-in shower  Bathroom Toilet: Handicap height  Bathroom Accessibility: Accessible    Receives Help From: Other (comment) (facility staff)  ADL Assistance: Needs assistance  Homemaking Assistance: Needs assistance  Homemaking Responsibilities: No  Ambulation Assistance: Needs assistance  Transfer Assistance: Needs assistance    Active : No  Patient's  Info: family provides transportation services     Additional Comments: Pt amb with 4ww for >500 feet with mobility. Pt required assist from staff for ADL and IADL tasks. Pt was previously in therapy at nursing home.     VISION:Corrected - wears glasses    HEARING:  WFL    COGNITION: Decreased Recall, Decreased Insight, Impaired Memory, Decreased Problem Solving and Decreased Safety Awareness   Pt with diagnosis of Alzheimer's Dementia    RANGE OF MOTION:  Bilateral Lower Extremity: WFL    STRENGTH:  Bilateral Upper Extremity:  not formally assessed, grossly deconditioned    SENSATION:   WFL    ADL:   Grooming: Stand By Assistance, with verbal cues  and with increased time for completion. to wash hands standing sinkside  Toileting: Stand By Assistance. at toilet with cues/reminders for sequencing during clothing management  Toilet Transfer: [address] with cues for hand placement. BALANCE:  Sitting Balance:  Stand By Assistance. Standing Balance: Stand By Assistance, with cues for safety. BED MOBILITY:  Not Tested    TRANSFERS:  Sit to Stand:  Stand By Assistance. Stand to Sit: Stand By Assistance. FUNCTIONAL MOBILITY:  Assistive Device: Rolling Walker and None  Assist Level:  Stand By Assistance. Distance: To and from bathroom and on unit at [facility]  Pt navigated to/from BR without a device - pt with increased confusion when given device for ambulation to bathroom with daughter stating she has not been using a walker when going to the bathroom during admission. Daughter also states pt uses 4WW at facility. Multiple safety cues required throughout mobility in hallway due to pt frequently turning to talk with this therapist.     Activity Tolerance:  Patient tolerance of  treatment: fair. Assessment:  Assessment: Patient presents with decreased endurance, balance, and safety awareness resulting in overall decreased participation in all self care, transfer, and mobility tasks. Pt currently required SBA for all mobility and self care tasks and would benefit from skilled OT services throughout admission and after discharge to increase safety and independence needed to return to OF. Without skilled OT services pt is at increased risk of falls and caregiver burden.   Performance deficits / Impairments: Decreased functional mobility , Decreased safe awareness, Decreased cognition, Decreased ADL status, Decreased endurance  Prognosis: Fair  REQUIRES OT FOLLOW UP: Yes    Treatment Initiated: Treatment and education initiated within context of evaluation. Evaluation time included review of current medical information, gathering information related to past medical, social and functional history, completion of standardized testing, formal and informal observation of tasks, assessment of data and development of plan of care and goals. Treatment time included skilled education and facilitation of tasks to increase safety and independence with ADL's for improved functional independence and quality of life. Discharge Recommendations:  Subacute/Skilled Nursing Facility    Patient Education:  OT Education: OT Role, Plan of Care    Equipment Recommendations:  Equipment Needed: No (defer to next level of care)    Plan:  Times per week: 3-5x  Current Treatment Recommendations: Self-Care / ADL, Functional Mobility Training, Endurance Training, Strengthening. See long-term goal time frame for expected duration of plan of care. If no long-term goals established, a short length of stay is anticipated. Goals:     Short term goals  Time Frame for Short term goals: By discharge  Short term goal 1: Pt [facility] S and 0-2 cues for safety to increase indep in accessing home environment. Short term goal 2: Patient to complete dynamic standing task x5 minutes with 2UE release and no cues for safety to increase indep in sinkside grooming tasks. Short term goal 3: Patient to complete BADL routine with no > min A and 2 cues for sequencing/redirection to increase indep in ADL completion. Following session, patient left in safe position with all fall risk precautions in place.

## 2021-10-13 NOTE — H&P
Hospitalist - History & Physical      Patient: Jose Broussard    Unit/Bed:4A-16/016-A  YOB: 1935  MRN: [id number]   Acct: [de-identified]   PCP: Iveth Cedillo MD    Date of Service: Pt seen/examined on 10/13/21  and Admitted to Inpatient with expected LOS greater than two midnights due to medical therapy. Chief Complaint:  Seizure like activity    Assessment and Plan:    1. ?Absence Seizure: CT head and CTA head and neck negative for acute pathology. Patient does have a UTI, possible coexisting infectious encephalopathy. Symptomology reported by daughter reminiscent of absence seizures. History of previous \"zoning out\" with return to baseline within minutes. Mentation improved prior to inpatient admission. · Neurology already on board. · Plan for EEG on 10/13/2021. · Seizure and Fall precautions. · Neuro checks every 4 hours. 2. Urinary Tract Infection: As evident by positive urinalysis obtained on admission. Patient does report that lately she has been urinating more frequently despite not increasing her intake. Received Rocephin in the ED. Afebrile, no leukocytosis. · Based on symptomology, will continue with IV Rocephin daily. · Tailor antibiotics as indicated with urine culture results. 3. Persistent Atrial Fibrillation: [id number] score 5. Anticoagulated with Coumadin 2.5 mg daily. Rate controlled with Toprol-XL and Lanoxin. Follows with Coumadin clinic at [facility]. · Keep on continuous telemetry. · Continue with Toprol-XL and Lanoxin. 4. Coronary Artery Disease: Daughter at bedside states that patient underwent LHC without PCI in 2015. Patient then states that a few months afterwards she developed severe CVA however history is a bit unclear based on chart review. Patient currently denies any active chest pain. Troponin T negative. EKG without acute ischemic changes. Continue to monitor. 5. Essential Hypertension: BP running stable.  Continue with Toprol-XL daily.  6. Hypercholesterolemia: Continue with Lipitor daily. 7. Alzheimer's Dementia: Compounded also with vascular dementia. Patient requires frequent reorientation as per daughter. Recently has become combative and agitated as per her daughter due to a change in her roommate at the nursing home. Patient is not on any cognitive enhancing medications. · Administer melatonin nightly to maintain circadian rhythm. · Nursing swallow assessment prior to medications. · Minimize auditory stimulation. · Limit disturbances between 2200 and 0500 except for hourly rounding. · Ambulate patient. Keep patient up in chair during most of the day, particular during meals. · PT/OT/SLP. History Of Present Illness:      Dewayne Webster is a 82 y.o. female with PMH of Alzheimer's dementia compounded by vascular dementia, persistent A. fib chronically on Coumadin, colon cancer in 2015 treated surgically, HTN, HLD. Patient resides at a NH. Accompanied by daughter at bedside. Per patient's daughter, earlier this evening while the patient was talking with other women/friends, she had all of a sudden started to Blu off\" into space and was nonresponsive to verbal command. Furthermore, patient's daughter states that her eyes looked dull like. EMS was contacted immediately. On arrival to our facility patient was initially unresponsive and not making pure purposeful movements. No seizure-like activity was noted by staff. No history of bladder or bowel incontinence. No history of seizure disorder. However, patient's daughter states that she has had episodes in the past where she would seem to stare off/zone out but only for a minute or so before coming back. Patient apparently also has a UTI in which the nursing home was delaying antibiotic treatment until culture reports came back. Patient had undergone immediate CT head with no acute mass-effect or hemorrhage. CTA head and neck revealed no aneurysm or dissection.  Neurology was consulted immediately. It was noted on chart review that the patient's mental status had begun to improve and she became more alert and responsive. On my personal assessment, patient seems alert but is not totally oriented. She states currently that she is in no pain. She does however admit that she has been urinating more often than usual lately. She denies any current headache, presyncope, chest pain, palpitations, abdominal pain, shortness of breath, cough, N/V/C/D, extremity weakness or pain. Patient required frequent reorientation by her daughter in the room. Summarized ED course: Initial vital signs included temperature 98.1 °F, respirations 16, pulse 82 bpm, /90 mmHg, SPO2 96% on room air. Patient was given a dose of IV Rocephin in the ED. Pertinent Admission Labs:   CBC: WBC 7400, H&H 12.9/41.0, platelet count 788,277   BMP/CMP: Sodium 141, potassium 4.1, chloride 105, CO2 26, BUN 18, creatinine 0.9, AG 10, EGFR 60, glucose 105, calcium 9.2   Troponin T < 0.010   Albumin 4.1, alk phos 89, ALT 13, AST 19, bilirubin 0.5   INR 3.13, APTT 45.2   Urinalysis positive for nitrite, leukocyte esterase and bacteria    Pertinent Admission Imaging:   CXR: No acute intrathoracic process   CT head without contrast: No mass-effect or acute hemorrhage. Chronic periventricular small vessel ischemic changes and cerebral atrophy. Right-sided encephalomalacia.  CTA head and neck with and without contrast: No aneurysm or dissection    Past Medical History:  has a past medical history of AR (allergic rhinitis), Atrial fibrillation ([facility] [address] Cancer ([facility] [address] Cancer ([facility] [address] GERD (gastroesophageal reflux disease), History of blood transfusion, Hx of blood clots, Hyperlipidemia, Hypertension, MI, old, and Unspecified cerebral artery occlusion with cerebral infarction. Past Surgical History:  has a past surgical history that includes Endoscopy, colon, diagnostic; colectomy (03/26/2015);  Appendectomy; Colonoscopy (2016??); eye surgery (2010???); Hysterectomy; skin biopsy; Dilation and curettage of uterus; Dental surgery (around age 30 Years???); Cardiac catheterization (2006??); Mohs surgery (07/14/2017); and pre-malignant / benign skin lesion excision (07/14/2017). Home Medications:   No current facility-administered medications on file prior to encounter. Current Outpatient Medications on File Prior to Encounter   Medication Sig Dispense Refill    metoprolol succinate (TOPROL XL) 100 MG extended release tablet Take 100 mg by mouth daily      warfarin (COUMADIN) 5 MG tablet Take 5 mg by mouth Take every Mon, Wed, Fri, Sat      Multiple Vitamins-Minerals (CENTRUM SILVER 50+WOMEN) TABS Take 1 tablet by mouth daily      digoxin (LANOXIN) 125 MCG tablet Take 125 mcg by mouth daily Hold if pulse <60      aspirin 81 MG EC tablet Take 81 mg by mouth daily      estradiol (ESTRACE) 0.1 MG/GM vaginal cream Place 1 g vaginally daily Every Mon, Wed, Fri for recurrent UTI      furosemide (LASIX) 20 MG tablet Take 20 mg by mouth daily Every Mon, Wed, Fri      omeprazole (PRILOSEC) 20 MG delayed release capsule Take 20 mg by mouth daily      potassium chloride (KLOR-CON M) 10 MEQ extended release tablet Take 10 mEq by mouth daily Every Mon, Wed, Fri      hydrocortisone (PREPARATION H) 1 % cream Apply topically daily For malignant neoplasm of colon      simvastatin (ZOCOR) 20 MG tablet Take 20 mg by mouth nightly. Allergies:    Latex, Ciprofloxacin, and Codeine    Social History:  reports that she quit smoking about 51 years ago. Her smoking use included cigarettes. She smoked 0.25 packs per day. She has never used smokeless tobacco. She reports that she does not drink alcohol and does not use drugs. Family History: family history includes Arthritis in her father and sister; Cancer in her brother and sister; Diabetes in her sister; Heart Disease in her sister; Stroke in her mother.      Diet:  Diet NPO Exceptions are: Sips of Water with Meds    Review of systems:   Pertinent positives as noted in the HPI. All other systems reviewed and negative. PHYSICAL EXAM:   height is 5' 5\" (1.651 m) and weight is 145 lb 3.2 oz (65.9 kg). Her oral temperature is 97.6 °F (36.4 °C). Her blood pressure is 147/103 (abnormal) and her pulse is 62. Her respiration is 18 and oxygen saturation is 92%. Body mass index is 24.16 kg/m². Constitutional: In no acute distress. Patient with baseline cognitive deficits. Oriented to person but not place. Pleasant and cooperative. HENT:   Head: Normocephalic and atraumatic. Mouth/Throat: Oropharynx is clear and moist.  No oral thrush. Eyes: Conjunctivae are normal. Pupils are equal, round, and reactive to light. No scleral icterus. Neck: Neck supple. No JVD present. No tracheal deviation present. Cardiovascular: Normal rate, regular rhythm, normal heart sounds. No murmur heard. Pulmonary/Chest: Effort normal and breath sounds normal. No stridor. No respiratory distress. No wheezes. No rales. Patient exhibits no tenderness. Abdominal: Soft. Patient exhibits no distension. No tenderness. Bowel sounds present. Musculoskeletal: Normal range of motion. Extremities: Patient exhibits no edema and no tenderness. Lymphadenopathy: No cervical adenopathy. Neurological: Patient is alert and oriented to person only. Skin: Skin is warm and dry. Patient is not diaphoretic. No rashes or lesions. Psychiatric: Patient has a normal mood and affect. Patient behavior is normal.     Electronically signed by Roshni Weldon.  Ember Solis M.D. on 10/13/2021 at 3:10 AM

## 2021-10-13 NOTE — ED NOTES
Patient transported off floor, in cart in stable condition to [facility]. Spoke to [facility] prior to transport.       Maritza Mills  10/13/21 0032

## 2021-10-13 NOTE — PROGRESS NOTES
[address] [place] [address]  SPEECH THERAPY  [facility] NEUROSCIENCES 4A  Clinical Swallow Evaluation      SLP Individual Minutes  Time In: 8708  Time Out: 4555  Minutes: 15  Timed Code Treatment Minutes: 0 Minutes       Date: 10/13/2021  Patient Name: Giovana Dickerson      CSN: [id number]   : 1935  (82 y.o.)  Gender: female   Referring Physician:  Medina Paiz MD  Diagnosis: Acute cystitis without hematuria  Secondary Diagnosis: dysphagia  Precautions: aspiration precautions, fall risk  History of Present Illness/Injury: Patient admitted to [facility] with above medical dx. Per chart review \"Kristi Solorzano is a 82 y.o. female with PMH of Alzheimer's dementia compounded by vascular dementia, persistent A. fib chronically on Coumadin, colon cancer in 2015 treated surgically, HTN, HLD. Patient resides at a NH. Accompanied by daughter at bedside. Per patient's daughter, earlier this evening while the patient was talking with other women/friends, she had all of a sudden started to Blu off\" into space and was nonresponsive to verbal command. Furthermore, patient's daughter states that her eyes looked dull like. EMS was contacted immediately. On arrival to our facility patient was initially unresponsive and not making pure purposeful movements. No seizure-like activity was noted by staff. No history of bladder or bowel incontinence. No history of seizure disorder. However, patient's daughter states that she has had episodes in the past where she would seem to stare off/zone out but only for a minute or so before coming back. Patient apparently also has a UTI in which the nursing home was delaying antibiotic treatment until culture reports came back. Patient had undergone immediate CT head with no acute mass-effect or hemorrhage. CTA head and neck revealed no aneurysm or dissection. Neurology was consulted immediately.  It was noted on chart review that the patient's mental status had begun to improve and she became more alert and responsive. On my personal assessment, patient seems alert but is not totally oriented. She states currently that she is in no pain. She does however admit that she has been urinating more often than usual lately. She denies any current headache, presyncope, chest pain, palpitations, abdominal pain, shortness of breath, cough, N/V/C/D, extremity weakness or pain. Patient required frequent reorientation by her daughter in the room. \"  ST consulted to complete clinical swallow evaluation to determine patient's safe dietary level. 10/12: CT HEAD WO CONTRAST:   1. Calcified atherosclerosis of the carotid arteries bilaterally without significant stenosis. 2. No intracranial stenoses or occlusions. 3. Incomplete filling of the left atrial appendage. This could be related to contrast timing. A small amount of thrombus in the atrial appendage is also a consideration. Past Medical History:   Diagnosis Date    AR (allergic rhinitis)     Atrial fibrillation (HCC)     [facility] [address]     skin    Cancer ([facility] [address] 2015    colon  (surgery - no chemo or radiation)    GERD (gastroesophageal reflux disease)     History of blood transfusion at age 38 years    before hysterectomy    Hx of blood clots 2006??    brain    Hyperlipidemia     Hypertension     MI, old     Unspecified cerebral artery occlusion with cerebral infarction 2006    no weakness - left eye deficit (vision)       SUBJECTIVE:  RADHA Mary with approval to proceed with clinical swallow evaluation. OBJECTIVE:    Pain:  No pain reported. Current Diet: NPO    Respiratory Status:  Independent    Behavioral Observation:  Alert    Oral Mechanism Evaluation:      Facial / Labial Impaired L side weakness   Lingual WFL    Dentition WFL    Velum WFL    Vocal Quality Not Tested    Sensation Not Tested    Cough Not Tested      Patient Evaluated Using:   Thin liquids via cup and straw, puree, soft solid, hard solid    Oral Phase: Impaired:  Impaired Mastication    Pharyngeal Phase: Impaired:  Suspected Pharyngeal Residue  *not able to fully validate presence of pharyngeal phase deficits without formal instrumentation/supportive imaging     Signs and Symptoms of Laryngeal Penetration/Aspiration: Throat Clear and Delayed Cough    Impresssions: Patient presents with mild oral dysphagia evidenced by prolonged mastication. Patient's daughter reported patient at baseline requires increased time to complete a meal; patient's daughter also reported patient's alertness and \"attitude\" this date has increased. Patient demonstrated adequate bolus control, cohesive bolus formation, and AP transit with all trials consumed. Patient demonstrated x1 delayed cough and x1 throat clear ~3-5 minutes after thin liquid via straw presentation, which can be an indication for pharyngeal residue and/or airway invasion events. With all other trials consumed patient demonstrated no overt s/s of aspiration; certainly cannot r/o airway invasion events without formal instrumentation. ST with thorough education r/t overt s/s of aspiration (throat clears, coughing, wet vocal quality) and swallow strategies to utilize to ensure safe swallow function (upright positioning, small bite/sip, and alternate solids/liquids); patient and patient' daughter with verbal receptiveness. ST recommends patient consume a regular diet+thin liquids with intermittent supervision considering patient's PMH of Alzheimer's dementia. ST with f/u skilled dysphagia services to ensure baseline swallow function is achieved. RECOMMENDATIONS/ASSESSMENT:  Instrumental Evaluation: Instrumental evaluation not indicated at this time.   Diet Recommendations:  Regular diet+thin liquids  Strategies:  Full Upright Position, Small Bite/Sip, Pulmonary Monitoring, Oral Care after all Meals, Supervision, Alternate Solids and Liquids, Limit Distractions and Monitor for Fatigue   Rehabilitation Potential: Good    EDUCATION:  Learner: Patient and Family  Education:  Reviewed results and recommendations of this evaluation, Reviewed diet and strategies, Reviewed signs, symptoms and risks of aspiration, Reviewed ST goals and Plan of Care and Reviewed recommendations for follow-up  Evaluation of Education: Verbalizes understanding and Demonstrates with assistance    PLAN:  Skilled SLP intervention on acute care 3-5 x per week or until goals met and/or pt plateaus in function. Specific interventions for next session may include: dysphagia management. PATIENT GOAL:    Did not state. Will further assess during treatment. SHORT TERM GOALS:  Short-term Goals  Timeframe for Short-term Goals: 2 weeks  Goal 1: Patient will safely consume a regular diet+thin liquids with adequate bolus containment, textural breakdown, cohesive bolus formation, and AP transit with no overt s/s of aspiration to contrbute to nutrition/hydration. Goal 2: ST will monitor mentation and complete cognition evaluation should it become clinically indicated. Goal 3: Considerations for completion of MBS should it become clinically indicated. LONG TERM GOALS:  No LTG established due to short ELOS.     BEATA Hawkins., Student Intern  Balaji Valle M.A., [facility]

## 2021-10-13 NOTE — ED NOTES
ED to inpatient nurses report    Chief Complaint   Patient presents with    Cerebrovascular Accident      Present to ED from 42  LOC: alert and orientated to name and place  Vital signs   Vitals:    10/12/21 2104 10/12/21 2200 10/12/21 2300 10/13/21 0006   BP: (!) 149/90 (!) 120/97 134/80 (!) 129/95   Pulse: 82 97 98 77   Resp: 16  18 18   Temp:  98.1 °F (36.7 °C)     TempSrc:  Oral     SpO2: 96% 98% 98% 98%      Oxygen Baseline RM air    Current needs required n/a Bipap/Cpap No  LDAs:   Peripheral IV 10/12/21 Right Hand (Active)   Site Assessment Clean;Dry; Intact 10/12/21 2355   Line Status Flushed 10/12/21 2355   Dressing Status Clean;Dry; Intact 10/12/21 2355     Mobility: Requires assistance * 1  Pending ED orders: none  Present condition: Stable    Electronically signed by Jose Sterling RN on 10/13/2021 at 12:41 AM     Jose Sterling RN  10/13/21 7088

## 2021-10-13 NOTE — PROGRESS NOTES
Patient admitted to 4A Room 16 in a wheelchair  Complaint upon arrival to the room possible seizure activity  IV site free of s/s of infection or infiltration. Vital signs obtained. Assessment and data collection initiated. Oriented to room. Policies and procedures for  explained All questions answered with no further questions at this time. Fall prevention and safety brochure discussed with patient. 2 person skin check completed with Harry Segovia RN.

## 2021-10-13 NOTE — ED NOTES
Pt transported directly to [address] [place] [address] upon arrival.     Atul Reece RN  10/12/21 0959

## 2021-10-13 NOTE — ED TRIAGE NOTES
Patient arrived via EMS with complaints of stroke like symptoms and a stroke alert was called on patient. LKW 2040. Patient was more confused than baseline and had bilateral upper body weakness. Hx of strokes.

## 2021-10-13 NOTE — ED NOTES
Patient in CT at present. EMS states last known well is 2040. PA at bedside.      Valentine Lopez RN  10/12/21 2114        Valentine Lopez RN  10/12/21 2118

## 2021-10-13 NOTE — PROGRESS NOTES
Neurology Progress Note    Date:10/13/2021       ZFRZ:[id number]  Patient Name:Kristi Hoyos     YOB: 1935     Age:86 y.o.    CC: unresponsive episode    HPI: Iris Balbuena who is a 82 y.o. female with a history of Alzheimer's dementia, atrial fibrillation currently on Coumadin, and previous ischemic stroke who presents as a stroke alert activation. Per the patient's daughter who is present at bedside, the patient had a unresponsive episode for approximately 1 hour. She saw the patient earlier in the day and the patient was doing well. However she was later contacted by the nursing staff at the patient's facility who informed her that the patient was unresponsive. Upon arrival to the facility, the patient was unresponsive and was not making any purposeful movements. There was no seizure-like activity noted, but the daughter does state that the patient's right arm was cupped. There is no bowel or bladder incontinence. She has no history of seizure disorder but has had unresponsive episodes in the past.  According to her daughter, she has a urinary tract infection but the nursing home was waiting on culture to begin antibiotics. The patient was taken promptly to CT scan where she had a noncontrast CT of the head and a CTA of the head and neck which revealed no acute abnormalities. Over the patient's course in the emergency department she became increasingly alert and oriented. Initially, she was unable to follow simple commands, but over time was more responsive. Time of symptoms onset/last time seen at baseline: 8 PM this evening. Time of stroke alert: 8:54 PM.  Time of Neurology arrival: 8:55 PM.  Vascular risk factors:  Atrial fibrillation, hyperlipidemia, hypertension  Initial Glucose: 98.  Old deficits from prior stroke: None. INR in ED if anticoagulated: 3.13.  BP in ED: 149/90. Initial NIHSS: 1.  Modified [place] Score upon admission: 3.    IV tPA administerd: Not indicated. Time of Initial Imaging Read: 9:12 PM  Thrombectomy performed: Not indicated. Puncture time: Not indicated. Interval history 10/13/2021:  Doing well. Looks better than last couple weeks of per daughter. No headaches, lightheadedness, weakness, numbness, dizziness, vision changes. No further episodes of unresponsiveness. Review of Systems   Review of Systems   Eyes: Negative for visual disturbance. Neurological: Negative for dizziness, weakness, numbness and headaches. Medications   Scheduled Meds:   Continuous Infusions:   PRN Meds:     Past History    Past Medical History:   has a past medical history of AR (allergic rhinitis), Atrial fibrillation ([address] Cancer ([facility] [address] Cancer ([facility] [address] GERD (gastroesophageal reflux disease), History of blood transfusion, Hx of blood clots, Hyperlipidemia, Hypertension, MI, old, and Unspecified cerebral artery occlusion with cerebral infarction. Social History:   reports that she quit smoking about 51 years ago. Her smoking use included cigarettes. She smoked 0.25 packs per day. She has never used smokeless tobacco. She reports that she does not drink alcohol and does not use drugs.      Family History:   Family History   Problem Relation Age of Onset    Stroke Mother     Arthritis Father     Cancer Sister         ovarian    Cancer Brother         pancreatic    Diabetes Sister     Arthritis Sister     Heart Disease Sister        Physical Examination        NIH Stroke Scale  1a Level of consciousness 0=alert; keenly responsive   1b LOC questions 1=Performs one task correctly   1c LOC commands 0=Performs both tasks correctly   2 Best Gaze 0=normal   3 Visual 0=No visual loss   4 Facial Palsy 0=Normal symmetric movement   5a Motor left arm 0=No drift, limb holds 90 (or 45) degrees for full 10 seconds   5b Motor right arm 0=No drift, limb holds 90 (or 45) degrees for full 10 seconds   6a Motor left leg 0=No drift, limb holds 90 (or 45) degrees for full 10 seconds   6b Motor right leg 0=No drift, limb holds 90 (or 45) degrees for full 10 seconds   7 Limb Ataxia 0=Absent   8 Sensory 0=Normal; no sensory loss   9 Best Language 0=No aphasia, normal   10 Dysarthria 0=Normal   11 Extinction and Inattention 0=No abnormality   TOTAL  1   Time NIHSS performed: 7:40 AM    Vitals:  BP (!) 144/101   Pulse 100   Temp 97.3 °F (36.3 °C) (Oral)   Resp 16   Ht 5' 5\" (1.651 m)   Wt 145 lb 3.2 oz (65.9 kg)   SpO2 98%   BMI 24.16 kg/m²   Temp (24hrs), Av.7 °F (36.5 °C), Min:97.3 °F (36.3 °C), Max:98.1 °F (36.7 °C)      I/O (24Hr): Intake/Output Summary (Last 24 hours) at 10/13/2021 0774  Last data filed at 10/13/2021 0355  Gross per 24 hour   Intake 10 ml   Output --   Net 10 ml       Physical Exam  Vitals reviewed. Constitutional:       General: She is not in acute distress. Appearance: Normal appearance. She is not ill-appearing. HENT:      Head: Normocephalic and atraumatic. Right Ear: External ear normal.      Left Ear: External ear normal.      Nose: Nose normal.      Mouth/Throat:      Mouth: Mucous membranes are moist.      Pharynx: No oropharyngeal exudate or posterior oropharyngeal erythema. Eyes:      Extraocular Movements: EOM normal.      Pupils: Pupils are equal, round, and reactive to light. Cardiovascular:      Rate and Rhythm: Tachycardia present. Rhythm irregular. Heart sounds: Normal heart sounds. No murmur heard. Pulmonary:      Effort: Pulmonary effort is normal. No respiratory distress. Breath sounds: Normal breath sounds. No wheezing. Abdominal:      General: Bowel sounds are normal.      Palpations: Abdomen is soft. Tenderness: There is no abdominal tenderness. Musculoskeletal:         General: Normal range of motion. Right lower leg: No edema. Left lower leg: No edema. Skin:     General: Skin is warm. Findings: No rash. Neurological:      Mental Status: She is alert. Coordination: Finger-Nose-Finger Test and Heel to [place] jay Test normal.      Deep Tendon Reflexes:      Reflex Scores:       Bicep reflexes are 2+ on the right side and 2+ on the left side. Brachioradialis reflexes are 2+ on the right side and 2+ on the left side. Patellar reflexes are 2+ on the right side and 2+ on the left side. Psychiatric:         Mood and Affect: Mood normal.         Speech: Speech normal.         Behavior: Behavior normal.       Neurologic Exam     Mental Status   Oriented to person. Disoriented to place. Disoriented to time. Attention: decreased. Concentration: decreased. Speech: speech is normal   Level of consciousness: alert  Normal comprehension. Cranial Nerves     CN II   Visual fields full to confrontation. Right visual field deficit: none  Left visual field deficit: none     CN III, IV, VI   Pupils are equal, round, and reactive to light. Extraocular motions are normal.   Right pupil: Shape: regular. Reactivity: brisk. Left pupil: Shape: regular. Reactivity: brisk. CN V   Facial sensation intact. Right facial sensation deficit: none  Left facial sensation deficit: none    CN VII   Facial expression full, symmetric.    Right facial weakness: none  Left facial weakness: none    CN VIII   CN VIII normal.   Hearing: intact    CN IX, X   CN IX normal.   CN X normal.   Palate: symmetric    CN XI   CN XI normal.   Right trapezius strength: normal  Left trapezius strength: normal    CN XII   CN XII normal.   Tongue: not atrophic  Fasciculations: absent  Tongue deviation: none    Motor Exam   Muscle bulk: normal  Overall muscle tone: normal  Right arm tone: normal  Left arm tone: normal  Right arm pronator drift: absent  Left arm pronator drift: absent  Right leg tone: normal  Left leg tone: normal  Muscle strength 5/5 in bilateral upper and lower extremities     Sensory Exam   Light touch normal.     Gait, Coordination, and Reflexes     Coordination   Finger to nose coordination: normal  Heel to shin coordination: normal    Reflexes   Right brachioradialis: 2+  Left brachioradialis: 2+  Right biceps: 2+  Left biceps: 2+  Right patellar: 2+  Left patellar: 2+       Labs/Imaging/Diagnostics   Labs:  CBC:  Recent Labs     10/12/21  2118 10/13/21  0422   WBC 7.4 6.7   RBC 4.12* 3.98*   HGB 12.9 12.5   HCT 41.0 39.9   MCV 99.5* 100.3*    148     CHEMISTRIES:  Recent Labs     10/12/21  2118 10/13/21  0422    143   K 4.1 4.2    105   CO2 26 28   BUN 18 15   CREATININE 0.9 0.7   GLUCOSE 105 95     PT/INR:  Recent Labs     10/12/21  2118   INR 3.13*     APTT:  Recent Labs     10/12/21  2118   APTT 45.2*     LIVER PROFILE:  Recent Labs     10/12/21  2118 10/13/21  0422   AST 19 22   ALT 13 15   BILITOT 0.5 0.6   ALKPHOS 89 85       Imaging Last 24 Hours:  CT HEAD WO CONTRAST    Result Date: 10/12/2021  PROCEDURE: CT HEAD WO CONTRAST CLINICAL INFORMATION: Cerebrovascular accident TECHNIQUE: CT scan of the head was performed from the vertex to the skull base without use of intravenous contrast. Axial images as well as coronal and sagittal reconstructions were obtained. All CT scans at this facility use dose modulation, iterative reconstruction, and/or weight-based dosing when appropriate to reduce radiation dose to as low as reasonably achievable. COMPARISON: None. FINDINGS: There is no mass effect, midline shift or acute hemorrhage. Ventricles and sulci are prominent. There is diffuse periventricular white matter hypoattenuation. There is an area of encephalomalacia extending from the right parietal and temporal lobes into the right occipital lobe. Visualized orbits, paranasal sinuses and mastoid air cells are unremarkable. 1. No mass effect or acute hemorrhage. 2. Chronic periventricular small vessel ischemic changes and cerebral atrophy. 3. Right-sided encephalomalacia, likely secondary to prior infarct.  If there is clinical concern for an evolving infarct, brain MRI is recommended for further evaluation. **This report has been created using voice recognition software. It may contain minor errors which are inherent in voice recognition technology. ** Final report electronically signed by Dr. Aniya Stone on 10/12/2021 9:12 PM      Assessment and Plan:          1. AMS, improving, stroke alert  · CT and CTA head and neck negative for any acute findings. Patient was not a candidate for TPA due to lack of deficit and not a candidate for endovascular intervention due to lack of deficit and lack of LVO was seen on CT angiogram  · During her ER course, her mentation improved and she became more alert and responsive. Her history of 1 hour episode of unresponsiveness is concerning for seizure-like activity. EEG pending. We will hold off on AED at this time. · UTI treatment per primary team.  · Due to the resolution of her symptoms, we will not obtain an MRI at this time  · Neurology will continue to follow    Electronically signed by Jaylene Najera PA-C on 10/13/21 at 2:51 PM EDT    This patient was seen and evaluated with Dr. Sandy Neil and he is in agreement with the assessment and plan.

## 2021-10-14 VITALS
BODY MASS INDEX: 24.19 KG/M2 | OXYGEN SATURATION: 99 % | DIASTOLIC BLOOD PRESSURE: 63 MMHG | SYSTOLIC BLOOD PRESSURE: 134 MMHG | HEIGHT: 65 IN | WEIGHT: 145.2 LBS | RESPIRATION RATE: 18 BRPM | TEMPERATURE: 98 F | HEART RATE: 69 BPM

## 2021-10-14 LAB
ERYTHROCYTE [DISTWIDTH] IN BLOOD BY AUTOMATED COUNT: 15.1 % (ref 11.5–14.5)
ERYTHROCYTE [DISTWIDTH] IN BLOOD BY AUTOMATED COUNT: 56 FL (ref 35–45)
HCT VFR BLD CALC: 41.8 % (ref 37–47)
HEMOGLOBIN: 12.9 GM/DL (ref 12–16)
INR BLD: 2.52 (ref 0.85–1.13)
MCH RBC QN AUTO: 31.2 PG (ref 26–33)
MCHC RBC AUTO-ENTMCNC: 30.9 GM/DL (ref 32.2–35.5)
MCV RBC AUTO: 101 FL (ref 81–99)
ORGANISM: ABNORMAL
PLATELET # BLD: 167 THOU/MM3 (ref 130–400)
PMV BLD AUTO: 9.9 FL (ref 9.4–12.4)
RBC # BLD: 4.14 MILL/MM3 (ref 4.2–5.4)
URINE CULTURE REFLEX: ABNORMAL
WBC # BLD: 6.1 THOU/MM3 (ref 4.8–10.8)

## 2021-10-14 PROCEDURE — 85610 PROTHROMBIN TIME: CPT

## 2021-10-14 PROCEDURE — 85027 COMPLETE CBC AUTOMATED: CPT

## 2021-10-14 PROCEDURE — 99239 HOSP IP/OBS DSCHRG MGMT >30: CPT | Performed by: INTERNAL MEDICINE

## 2021-10-14 PROCEDURE — 99232 SBSQ HOSP IP/OBS MODERATE 35: CPT | Performed by: PHYSICIAN ASSISTANT

## 2021-10-14 PROCEDURE — 95819 EEG AWAKE AND ASLEEP: CPT

## 2021-10-14 PROCEDURE — 6370000000 HC RX 637 (ALT 250 FOR IP): Performed by: STUDENT IN AN ORGANIZED HEALTH CARE EDUCATION/TRAINING PROGRAM

## 2021-10-14 PROCEDURE — 36415 COLL VENOUS BLD VENIPUNCTURE: CPT

## 2021-10-14 RX ORDER — WARFARIN SODIUM 3 MG/1
3 TABLET ORAL
Status: DISCONTINUED | OUTPATIENT
Start: 2021-10-14 | End: 2021-10-14 | Stop reason: HOSPADM

## 2021-10-14 RX ORDER — METOPROLOL SUCCINATE 25 MG/1
25 TABLET, EXTENDED RELEASE ORAL 2 TIMES DAILY
Qty: 30 TABLET | Refills: 3 | Status: SHIPPED | OUTPATIENT
Start: 2021-10-14

## 2021-10-14 RX ORDER — CEFDINIR 300 MG/1
300 CAPSULE ORAL EVERY 12 HOURS SCHEDULED
Status: DISCONTINUED | OUTPATIENT
Start: 2021-10-14 | End: 2021-10-14 | Stop reason: HOSPADM

## 2021-10-14 RX ORDER — LACTOBACILLUS RHAMNOSUS GG 10B CELL
1 CAPSULE ORAL
Status: DISCONTINUED | OUTPATIENT
Start: 2021-10-14 | End: 2021-10-14 | Stop reason: HOSPADM

## 2021-10-14 RX ORDER — LACTOBACILLUS RHAMNOSUS GG 10B CELL
1 CAPSULE ORAL
Qty: 3 CAPSULE | Refills: 0 | Status: SHIPPED | OUTPATIENT
Start: 2021-10-15 | End: 2021-10-18

## 2021-10-14 RX ORDER — CEFDINIR 300 MG/1
300 CAPSULE ORAL EVERY 12 HOURS SCHEDULED
Qty: 6 CAPSULE | Refills: 0 | Status: SHIPPED | OUTPATIENT
Start: 2021-10-14 | End: 2021-10-17

## 2021-10-14 RX ORDER — WARFARIN SODIUM 2.5 MG/1
2.5 TABLET ORAL
Status: DISCONTINUED | OUTPATIENT
Start: 2021-10-15 | End: 2021-10-14 | Stop reason: HOSPADM

## 2021-10-14 RX ADMIN — CEFDINIR 300 MG: 300 CAPSULE ORAL at 09:23

## 2021-10-14 RX ADMIN — Medication 1 CAPSULE: at 09:23

## 2021-10-14 ASSESSMENT — PAIN SCALES - GENERAL
PAINLEVEL_OUTOF10: 0

## 2021-10-14 NOTE — PLAN OF CARE
Problem: Falls - Risk of:  Goal: Will remain free from falls  Description: Will remain free from falls  Outcome: Ongoing  Note: Patient remained free from falls this shift. Bed is in low position with alarm on and siderails up x2. Patient ambulates with one assist. Education given on use of call light and patient voiced understanding. Daughter at bedside throughout this shift for patient safety. Patient/family uses call light appropriately. Call light and beside table within reach. Arm band and falling star in place. Goal: Absence of physical injury  Description: Absence of physical injury  Outcome: Ongoing  Note: Patient remained free from falls this shift. Bed is in low position with alarm on and siderails up x2. Patient ambulates with one assist. Education given on use of call light and patient voiced understanding. Daughter at bedside throughout this shift for patient safety. Patient/family uses call light appropriately. Call light and beside table within reach. Arm band and falling star in place. Problem: Skin Integrity:  Goal: Will show no infection signs and symptoms  Description: Will show no infection signs and symptoms  Outcome: Ongoing  Note: /63   Pulse 69   Temp 98 °F (36.7 °C) (Oral)   Resp 18   Ht 5' 5\" (1.651 m)   Wt 145 lb 3.2 oz (65.9 kg)   SpO2 99%   BMI 24.16 kg/m²   Pt showed no s/s of infection this shift. Goal: Absence of new skin breakdown  Description: Absence of new skin breakdown  Outcome: Ongoing  Note: No signs of new skin breakdown noted with each assessment this shift. Pt able to reposition self without difficulty. Plan of Care discussed with patient and family. They verbalized understanding.

## 2021-10-14 NOTE — PROGRESS NOTES
Neurology Progress Note    Date:10/14/2021       [facility]:[id number]  Patient Name:Delores Delwyn Goldmann     YOB: 1935     Age:86 y.o.    CC: unresponsive episode    HPI: Oneida Lay who is a 82 y.o. female with a history of Alzheimer's dementia, atrial fibrillation currently on Coumadin, and previous ischemic stroke who presents as a stroke alert activation. Per the patient's daughter who is present at bedside, the patient had a unresponsive episode for approximately 1 hour. She saw the patient earlier in the day and the patient was doing well. However she was later contacted by the nursing staff at the patient's facility who informed her that the patient was unresponsive. Upon arrival to the facility, the patient was unresponsive and was not making any purposeful movements. There was no seizure-like activity noted, but the daughter does state that the patient's right arm was cupped. There is no bowel or bladder incontinence. She has no history of seizure disorder but has had unresponsive episodes in the past.  According to her daughter, she has a urinary tract infection but the nursing home was waiting on culture to begin antibiotics. The patient was taken promptly to CT scan where she had a noncontrast CT of the head and a CTA of the head and neck which revealed no acute abnormalities. Over the patient's course in the emergency department she became increasingly alert and oriented. Initially, she was unable to follow simple commands, but over time was more responsive. Time of symptoms onset/last time seen at baseline: 8 PM this evening. Time of stroke alert: 8:54 PM.  Time of Neurology arrival: 8:55 PM.  Vascular risk factors:  Atrial fibrillation, hyperlipidemia, hypertension  Initial Glucose: 98.  Old deficits from prior stroke: None. INR in ED if anticoagulated: 3.13.  BP in ED: 149/90. Initial NIHSS: 1.  Modified Nancy Score upon admission: 3.    IV tPA administerd: Not indicated. Time of Initial Imaging Read: 9:12 PM  Thrombectomy performed: Not indicated. Puncture time: Not indicated. Interval history 10/13/2021:  Doing well. Looks better than last couple weeks of per daughter. No headaches, lightheadedness, weakness, numbness, dizziness, vision changes. No further episodes of unresponsiveness. Review of Systems   Review of Systems   Eyes: Negative for visual disturbance. Neurological: Negative for dizziness, weakness, numbness and headaches. Medications   Scheduled Meds:   Continuous Infusions:   PRN Meds:     Past History    Past Medical History:   has a past medical history of AR (allergic rhinitis), Atrial fibrillation ([address] Cancer ([facility] [address] Cancer ([facility] [address] GERD (gastroesophageal reflux disease), History of blood transfusion, Hx of blood clots, Hyperlipidemia, Hypertension, MI, old, and Unspecified cerebral artery occlusion with cerebral infarction. Social History:   reports that she quit smoking about 51 years ago. Her smoking use included cigarettes. She smoked 0.25 packs per day. She has never used smokeless tobacco. She reports that she does not drink alcohol and does not use drugs.      Family History:   Family History   Problem Relation Age of Onset    Stroke Mother     Arthritis Father     Cancer Sister         ovarian    Cancer Brother         pancreatic    Diabetes Sister     Arthritis Sister     Heart Disease Sister        Physical Examination        NIH Stroke Scale  1a Level of consciousness 0=alert; keenly responsive   1b LOC questions 1=Performs one task correctly   1c LOC commands 0=Performs both tasks correctly   2 Best Gaze 0=normal   3 Visual 0=No visual loss   4 Facial Palsy 0=Normal symmetric movement   5a Motor left arm 0=No drift, limb holds 90 (or 45) degrees for full 10 seconds   5b Motor right arm 0=No drift, limb holds 90 (or 45) degrees for full 10 seconds   6a Motor left leg 0=No drift, limb holds 90 (or 45) degrees for full 10 seconds   6b Motor right leg 0=No drift, limb holds 90 (or 45) degrees for full 10 seconds   7 Limb Ataxia 0=Absent   8 Sensory 0=Normal; no sensory loss   9 Best Language 0=No aphasia, normal   10 Dysarthria 0=Normal   11 Extinction and Inattention 0=No abnormality   TOTAL  1   Time NIHSS performed: 8:11 AM    Vitals:  BP (!) 144/77   Pulse 98   Temp 98.2 °F (36.8 °C) (Oral)   Resp 18   Ht 5' 5\" (1.651 m)   Wt 145 lb 3.2 oz (65.9 kg)   SpO2 96%   BMI 24.16 kg/m²   Temp (24hrs), Av.3 °F (36.3 °C), Min:96 °F (35.6 °C), Max:98.2 °F (36.8 °C)      I/O (24Hr): Intake/Output Summary (Last 24 hours) at 10/14/2021 0811  Last data filed at 10/14/2021 0354  Gross per 24 hour   Intake 150.16 ml   Output    Net 150.16 ml       Physical Exam  Vitals reviewed. Constitutional:       General: She is not in acute distress. Appearance: Normal appearance. She is not ill-appearing. HENT:      Head: Normocephalic and atraumatic. Right Ear: External ear normal.      Left Ear: External ear normal.      Nose: Nose normal.      Mouth/Throat:      Mouth: Mucous membranes are moist.      Pharynx: No oropharyngeal exudate or posterior oropharyngeal erythema. Eyes:      Extraocular Movements: EOM normal.      Pupils: Pupils are equal, round, and reactive to light. Cardiovascular:      Rate and Rhythm: Tachycardia present. Rhythm irregular. Heart sounds: Normal heart sounds. No murmur heard. Pulmonary:      Effort: Pulmonary effort is normal. No respiratory distress. Breath sounds: Normal breath sounds. No wheezing. Abdominal:      General: Bowel sounds are normal.      Palpations: Abdomen is soft. Tenderness: There is no abdominal tenderness. Musculoskeletal:         General: Normal range of motion. Right lower leg: No edema. Left lower leg: No edema. Skin:     General: Skin is warm. Findings: No rash. Neurological:      Mental Status: She is alert. Coordination: Finger-Nose-Finger Test and Heel to [place] jay Test normal.      Deep Tendon Reflexes:      Reflex Scores:       Bicep reflexes are 2+ on the right side and 2+ on the left side. Brachioradialis reflexes are 2+ on the right side and 2+ on the left side. Patellar reflexes are 2+ on the right side and 2+ on the left side. Psychiatric:         Mood and Affect: Mood normal.         Speech: Speech normal.         Behavior: Behavior normal.       Neurologic Exam     Mental Status   Oriented to person. Disoriented to place. Disoriented to time. Attention: decreased. Concentration: decreased. Speech: speech is normal   Level of consciousness: alert  Normal comprehension. Cranial Nerves     CN II   Visual fields full to confrontation. Right visual field deficit: none  Left visual field deficit: none     CN III, IV, VI   Pupils are equal, round, and reactive to light. Extraocular motions are normal.   Right pupil: Shape: regular. Reactivity: brisk. Left pupil: Shape: regular. Reactivity: brisk. CN V   Facial sensation intact. Right facial sensation deficit: none  Left facial sensation deficit: none    CN VII   Facial expression full, symmetric.    Right facial weakness: none  Left facial weakness: none    CN VIII   CN VIII normal.   Hearing: intact    CN IX, X   CN IX normal.   CN X normal.   Palate: symmetric    CN XI   CN XI normal.   Right trapezius strength: normal  Left trapezius strength: normal    CN XII   CN XII normal.   Tongue: not atrophic  Fasciculations: absent  Tongue deviation: none    Motor Exam   Muscle bulk: normal  Overall muscle tone: normal  Right arm tone: normal  Left arm tone: normal  Right arm pronator drift: absent  Left arm pronator drift: absent  Right leg tone: normal  Left leg tone: normal  Muscle strength 5/5 in bilateral upper and lower extremities     Sensory Exam   Light touch normal.     Gait, Coordination, and Reflexes     Coordination   Finger to nose coordination: normal  Heel to shin coordination: normal    Reflexes   Right brachioradialis: 2+  Left brachioradialis: 2+  Right biceps: 2+  Left biceps: 2+  Right patellar: 2+  Left patellar: 2+       Labs/Imaging/Diagnostics   Labs:  CBC:  Recent Labs     10/12/21  2118 10/13/21  0422   WBC 7.4 6.7   RBC 4.12* 3.98*   HGB 12.9 12.5   HCT 41.0 39.9   MCV 99.5* 100.3*    148     CHEMISTRIES:  Recent Labs     10/12/21  2118 10/13/21  0422    143   K 4.1 4.2    105   CO2 26 28   BUN 18 15   CREATININE 0.9 0.7   GLUCOSE 105 95     PT/INR:  Recent Labs     10/12/21  2118 10/13/21  0814 10/14/21  0404   INR 3.13* 2.85* 2.52*     APTT:  Recent Labs     10/12/21  2118   APTT 45.2*     LIVER PROFILE:  Recent Labs     10/12/21  2118 10/13/21  0422   AST 19 22   ALT 13 15   BILITOT 0.5 0.6   ALKPHOS 89 85       Imaging Last 24 Hours:  CT HEAD WO CONTRAST    Result Date: 10/12/2021  PROCEDURE: CT HEAD WO CONTRAST CLINICAL INFORMATION: Cerebrovascular accident TECHNIQUE: CT scan of the head was performed from the vertex to the skull base without use of intravenous contrast. Axial images as well as coronal and sagittal reconstructions were obtained. All CT scans at this facility use dose modulation, iterative reconstruction, and/or weight-based dosing when appropriate to reduce radiation dose to as low as reasonably achievable. COMPARISON: None. FINDINGS: There is no mass effect, midline shift or acute hemorrhage. Ventricles and sulci are prominent. There is diffuse periventricular white matter hypoattenuation. There is an area of encephalomalacia extending from the right parietal and temporal lobes into the right occipital lobe. Visualized orbits, paranasal sinuses and mastoid air cells are unremarkable. 1. No mass effect or acute hemorrhage. 2. Chronic periventricular small vessel ischemic changes and cerebral atrophy. 3. Right-sided encephalomalacia, likely secondary to prior infarct.  If there is clinical concern for an evolving infarct, brain MRI is recommended for further evaluation. **This report has been created using voice recognition software. It may contain minor errors which are inherent in voice recognition technology. ** Final report electronically signed by Dr. Vangie Vogt on 10/12/2021 9:12 PM      Assessment and Plan:          1. AMS, improving, stroke alert  · CT and CTA head and neck negative for any acute findings. Patient was not a candidate for TPA due to lack of deficit and not a candidate for endovascular intervention due to lack of deficit and lack of LVO was seen on CT angiogram  · During her ER course, her mentation improved and she became more alert and responsive. Her history of 1 hour episode of unresponsiveness is concerning for seizure-like activity. EEG pending. We will hold off on AED at this time. · UTI treatment per primary team.  · Due to the resolution of her symptoms, we will not obtain an MRI at this time  · Neurology will continue to follow    Electronically signed by Elgin Walker PA-C on 10/13/21 at 2:51 PM EDT    This patient was seen and evaluated with Dr. Adalberto Pelletier and he is in agreement with the assessment and plan.

## 2021-10-14 NOTE — DISCHARGE SUMMARY
patient's outpatient medication of metoprolol.     6.)  Hypercholesteremia: Patient takes Lipitor outpatient, continue.     7.)  Alzheimer's dementia/vascular dementia: Patient is alert and oriented only to person but not to place or time. Requires frequent reorientation. Her daughter is in the room with her during most of the day. As stated by her daughter, the patient has become combative and agitated which is outside of the home. Patient does not take cognitive enhancing medications. Inpatient, melatonin is used to help keep circadian rhythm. A swallow assessment was completed and patient was allowed to eat. From prior note:                 Ms. Deedee Cisneros is an 61-year-old female with a past medical history of Alzheimer's dementia, vascular dementia, persistent A. fib, previous colon cancer 8715 treated surgically, hypertension, hyperlipidemia. Patient currently resides in a nursing home. Patient was admitted to the ED after episode today where she was talking by other women in her nursing home when she stared off into space. During this episode she was nonresponsive to verbal commands. Her daughter was in the room with her at the time stated that her eyes looked normal.  EMS was contacted immediately brought the patient in for evaluation. On arrival to the ER, the patient was initially unresponsive and not making any purposeful movements. No seizure-like activity was noted by the staff. There was no history of bowel or bladder incontinence with this episode. The patient does not have a listed history of seizure disorder however her daughter states that she has episodes in the past where she would zone out or staring to space. Patient did have a UTI noted at the nursing which treatment was delayed for due to awaiting culture results. CT of the head showed no acute mass-effect or hemorrhage, CTA of the head and neck revealed no aneurysm or dissection.   Neurology was consulted and recommended an EEG. The patient's mental status improved prior to being admitted. She became more alert and responsive. The patient was able to say that she has been urinating more frequently lately. She denied any other symptoms on arrival including chest pain, nausea, vomiting, diarrhea, dizziness, syncope, or pain with urination. During admission, EEG was performed prior to discharge. Per the patient's family, the patient's mentation improved significantly with start of antibiotics. Preliminary antibiotics showed gram-negative enteric bacilli and so she was switched from ceftriaxone to oral cefdinir. Final urine culture result shows Klebsiella pneumonia which is still covered by cefdinir. She is being discharged on a short course of antibiotics along with probiotics. Prior to discharge, her family updated her CODE STATUS to DNR CC and papers were signed. She returned back to her nursing home in which she resides. EEG results were not returned prior to discharge however family will be updated.     Discharge Medications:   Fer Duran   Home Medication Instructions AOP:[id number]    Printed on:10/14/21 4977   Medication Information                      aspirin 81 MG EC tablet  Take 81 mg by mouth daily             cyanocobalamin 1000 MCG/ML injection  Inject 1,000 mcg into the muscle every 30 days On the 10th of each month             digoxin (LANOXIN) 125 MCG tablet  Take 125 mcg by mouth daily Hold if pulse <60             estradiol (ESTRACE) 0.1 MG/GM vaginal cream  Place 1 g vaginally daily Every Mon, Wed, Fri for recurrent UTI             furosemide (LASIX) 20 MG tablet  Take 20 mg by mouth daily Every Mon, Wed, Fri             hydrocortisone (PREPARATION H) 1 % cream  Apply topically daily For malignant neoplasm of colon             metoprolol succinate (TOPROL XL) 100 MG extended release tablet  Take 100 mg by mouth daily             Multiple Vitamins-Minerals (CENTRUM SILVER 50+WOMEN) TABS  Take 1 tablet by mouth daily             omeprazole (PRILOSEC) 20 MG delayed release capsule  Take 20 mg by mouth daily             potassium chloride (KLOR-CON M) 10 MEQ extended release tablet  Take 10 mEq by mouth daily Every Mon, Wed, Fri             simvastatin (ZOCOR) 20 MG tablet  Take 20 mg by mouth nightly. warfarin (COUMADIN) 5 MG tablet  Take 2.5 mg every Mon, Wed, Fri, Sat and 3 mg Tues, Thurs, Sun  Dosed by Provider at [facility]                 Patient Instructions:    Discharge lab work: None  Activity: activity as tolerated  Diet: ADULT DIET; Regular    Code Status: DNR-CC    Follow-up visits:   [address]  [phone]           Procedures: None, EEG results to be shared with the patient once they result. Consults:   IP CONSULT TO PHARMACY  PHARMACY TO CHANGE BASE FLUIDS  IP CONSULT TO NEUROLOGY  PHARMACY TO DOSE WARFARIN  IP CONSULT TO SOCIAL WORK    Examination:  Vitals:  Vitals:    10/13/21 1615 10/13/21 1928 10/13/21 2304 10/14/21 0340   BP: 131/73 (!) 101/57 119/76 (!) 144/77   Pulse: 68 96 93 98   Resp: 16 16 18 18   Temp: 96.2 °F (35.7 °C) 96 °F (35.6 °C) 98.1 °F (36.7 °C) 98.2 °F (36.8 °C)   TempSrc: Oral Axillary Oral Oral   SpO2: 95%  96% 96%   Weight:       Height:         Weight: Weight: 145 lb 3.2 oz (65.9 kg)     24 hour intake/output:    Intake/Output Summary (Last 24 hours) at 10/14/2021 0713  Last data filed at 10/14/2021 0354  Gross per 24 hour   Intake 150.16 ml   Output --   Net 150.16 ml       General appearance:  No apparent distress, appears stated age and cooperative. HEENT:  Normal cephalic, atraumatic without obvious deformity. Pupils equal, round, and reactive to light. Extra ocular muscles intact. Conjunctivae/corneas clear. Neck: Supple, with full range of motion. No jugular venous distention. Trachea midline. Respiratory:  Normal respiratory effort.  Clear to auscultation, bilaterally without Rales/Wheezes/Rhonchi. Cardiovascular:  Regular rate and rhythm with normal S1/S2 without murmurs, rubs or gallops. Abdomen: Soft, non-tender, non-distended with normal bowel sounds. Musculoskeletal:  No clubbing or cyanosis, +1 pitting edema in the lower limbs bilaterally. Full range of motion without deformity. Skin: Skin color, texture, turgor normal.  No rashes or lesions. Neurologic:  Neurovascularly intact without any focal sensory/motor deficits. Cranial nerves: II-XII intact, grossly non-focal.  Psychiatric:  Alert and oriented to person but not place or time, thought content appropriate, normal insight  Capillary Refill: Brisk,< 3 seconds   Peripheral Pulses: +2 palpable, equal bilaterally       Significant Diagnostics:   Radiology: CTA HEAD W WO CONTRAST (CODE STROKE)    Result Date: 10/13/2021  PROCEDURE: CTA NECK W WO CONTRAST, CTA HEAD W WO CONTRAST CLINICAL INFORMATION: CEREBROVASCULAR ACCIDENT, stroke, stroke. Unresponsive episode. COM this lasted one hour. PARISON: Head CT 10/12/2021. TECHNIQUE: 1 mm axial images were obtained through the head and neck after the fast bolus administration of contrast. A noncontrast localizer was obtained. 3-D reconstructions were performed on a dedicated 3-D workstation. These include multiplanar MPR images and multiplanar MIP images. Centerline reconstructions were obtained of the carotid systems. Isovue intravenous contrast was given. All CT scans at this facility use dose modulation, iterative reconstruction, and/or weight-based dosing when appropriate to reduce radiation dose to as low as reasonably achievable. FINDINGS: CTA NECK: Aortic arch and branches: There is mild calcified atherosclerosis of the aortic arch. There is no stenosis at the origin of innominate artery, left common carotid artery or either subclavian artery. There is some scattered calcified atherosclerosis of each subclavian artery.  Right common carotid artery/ICA: The proximal right common carotid artery is normal. There is some scattered calcified atherosclerosis of the distal right common carotid artery and the right carotid bulb. There is no associated significant stenosis. There is some mild irregularity of the mid right internal carotid artery consistent with soft plaque. There is no associated significant stenosis. There is some calcified plaque of the distal right internal carotid artery just below the skull base. Left common carotid artery/ICA: There is some calcified atherosclerosis at the origin left common carotid artery. There is some smooth calcified atherosclerosis in the mid left common carotid artery. There is calcified atheromatosis of the level of the left carotid bulb. There is no associated stenosis. There is no stenosis of the left internal carotid artery. Vertebral arteries: There is some calcified atherosclerosis at the origin the right vertebral artery. The origin is difficult to visualize. The remainder of the right in vertebral artery appears normal. The left vertebral artery appears normal. The right  vertebral artery slightly larger than the left. CTA HEAD: Internal carotid arteries: There is calcified atherosclerosis of the cavernous segments of the internal carotid arteries bilaterally. There is no associated stenosis. Ophthalmic artery origins are normal. Middle cerebral arteries: Normal. The proximal branches are also normal. 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 The left A1 segment is normal. There is a normal anterior indicating artery. A2 segments are normal. Vertebral arteries: The vertebral arteries are normal. Basilar artery: Normal. Superior cerebellar arteries: Normal. Posterior cerebral arteries: Normal. The proximal branches are also normal. No aneurysms, stenoses or occlusions are noted.  The superior sagittal sinus, vein of Marc, internal cerebral veins, straight sinus, transverse sinuses and sigmoid sinuses are patent. Axial source data: There is an complete filling of the distal left atrial appendage. There are no suspicious findings in the lung apices. There is no upper mediastinal or cervical adenopathy. There is evidence of an old infarct in the posterior medial right temporal lobe and the right occipital lobe. 1. Calcified atherosclerosis of the carotid arteries bilaterally without significant stenosis. 2. No intracranial stenoses or occlusions. 3. Incomplete filling of the left atrial appendage. This could be related to contrast timing. A small amount of thrombus in the atrial appendage is also a consideration. **This report has been created using voice recognition software. It may contain minor errors which are inherent in voice recognition technology. ** Final report electronically signed by Dr. Shiva Coley on 10/13/2021 8:16 AM    CT HEAD WO CONTRAST    Result Date: 10/12/2021  PROCEDURE: CT HEAD WO CONTRAST CLINICAL INFORMATION: Cerebrovascular accident TECHNIQUE: CT scan of the head was performed from the vertex to the skull base without use of intravenous contrast. Axial images as well as coronal and sagittal reconstructions were obtained. All CT scans at this facility use dose modulation, iterative reconstruction, and/or weight-based dosing when appropriate to reduce radiation dose to as low as reasonably achievable. COMPARISON: None. FINDINGS: There is no mass effect, midline shift or acute hemorrhage. Ventricles and sulci are prominent. There is diffuse periventricular white matter hypoattenuation. There is an area of encephalomalacia extending from the right parietal and temporal lobes into the right occipital lobe. Visualized orbits, paranasal sinuses and mastoid air cells are unremarkable. 1. No mass effect or acute hemorrhage. 2. Chronic periventricular small vessel ischemic changes and cerebral atrophy.  3. Right-sided encephalomalacia, likely secondary to prior just below the skull base. Left common carotid artery/ICA: There is some calcified atherosclerosis at the origin left common carotid artery. There is some smooth calcified atherosclerosis in the mid left common carotid artery. There is calcified atheromatosis of the level of the left carotid bulb. There is no associated stenosis. There is no stenosis of the left internal carotid artery. Vertebral arteries: There is some calcified atherosclerosis at the origin the right vertebral artery. The origin is difficult to visualize. The remainder of the right in vertebral artery appears normal. The left vertebral artery appears normal. The right  vertebral artery slightly larger than the left. CTA HEAD: Internal carotid arteries: There is calcified atherosclerosis of the cavernous segments of the internal carotid arteries bilaterally. There is no associated stenosis. Ophthalmic artery origins are normal. Middle cerebral arteries: Normal. The proximal branches are also normal. 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 The left A1 segment is normal. There is a normal anterior indicating artery. A2 segments are normal. Vertebral arteries: The vertebral arteries are normal. Basilar artery: Normal. Superior cerebellar arteries: Normal. Posterior cerebral arteries: Normal. The proximal branches are also normal. No aneurysms, stenoses or occlusions are noted. The superior sagittal sinus, vein of Marc, internal cerebral veins, straight sinus, transverse sinuses and sigmoid sinuses are patent. Axial source data: There is an complete filling of the distal left atrial appendage. There are no suspicious findings in the lung apices. There is no upper mediastinal or cervical adenopathy. There is evidence of an old infarct in the posterior medial right temporal lobe and the right occipital lobe. 1. Calcified atherosclerosis of the carotid arteries bilaterally without significant stenosis.  2. No intracranial stenoses or occlusions. 3. Incomplete filling of the left atrial appendage. This could be related to contrast timing. A small amount of thrombus in the atrial appendage is also a consideration. **This report has been created using voice recognition software. It may contain minor errors which are inherent in voice recognition technology. ** Final report electronically signed by Dr. Aure Carrillo on 10/13/2021 8:16 AM    XR CHEST PORTABLE    Result Date: 10/12/2021  PROCEDURE: XR CHEST PORTABLE CLINICAL INFORMATION: Indication provided by the ordering physician is \"stroke\". TECHNIQUE: Mobile AP chest radiograph. COMPARISON: PA and lateral chest radiographs 3/12/2000 FINDINGS: There is mild enlargement of the cardiac silhouette. Atherosclerotic calcifications are present in the thoracic aorta. L-spine granulomas are stable. There are no lung consolidations. Degenerative and scoliotic changes in the thoracolumbar spine are poorly visualized. Soft tissues are unremarkable. 1. No acute intrathoracic process. 2. Mild cardiomegaly. **This report has been created using voice recognition software. It may contain minor errors which are inherent in voice recognition technology. ** Final report electronically signed by Dr. Osman Leger on 10/12/2021 9:55 PM      Labs:   Recent Results (from the past 72 hour(s))   EKG 12 Lead    Collection Time: 10/12/21  9:03 PM   Result Value Ref Range    Ventricular Rate 98 BPM    QRS Duration 82 ms    Q-T Interval 326 ms    QTc Calculation (Bazett) 416 ms    R Axis -75 degrees    T Axis 65 degrees   POCT Creatinine    Collection Time: 10/12/21  9:16 PM   Result Value Ref Range    POC CREATININE WHOLE BLOOD 0.8 0.5 - 1.2 mg/dl   CBC Auto Differential    Collection Time: 10/12/21  9:18 PM   Result Value Ref Range    WBC 7.4 4.8 - 10.8 thou/mm3    RBC 4.12 (L) 4.20 - 5.40 mill/mm3    Hemoglobin 12.9 12.0 - 16.0 gm/dl    Hematocrit 41.0 37.0 - 47.0 %    MCV 99.5 (H) 81.0 - 99.0 fL    MCH 31.3 26.0 - 33.0 pg    MCHC 31.5 (L) 32.2 - 35.5 gm/dl    RDW-CV 15.2 (H) 11.5 - 14.5 %    RDW-SD 55.5 (H) 35.0 - 45.0 fL    Platelets 167 260 - 767 thou/mm3    MPV 9.8 9.4 - 12.4 fL    Seg Neutrophils 64.8 %    Lymphocytes 22.8 %    Monocytes 9.9 %    Eosinophils 1.5 %    Basophils 0.7 %    Immature Granulocytes 0.3 %    Segs Absolute 4.8 1 - 7 thou/mm3    Lymphocytes Absolute 1.7 1.0 - 4.8 thou/mm3    Monocytes Absolute 0.7 0.4 - 1.3 thou/mm3    Eosinophils Absolute 0.1 0.0 - 0.4 thou/mm3    Basophils Absolute 0.1 0.0 - 0.1 thou/mm3    Immature Grans (Abs) 0.02 0.00 - 0.07 thou/mm3    nRBC 0 /100 wbc   Comprehensive Metabolic Panel w/ Reflex to MG    Collection Time: 10/12/21  9:18 PM   Result Value Ref Range    Glucose 105 70 - 108 mg/dL    CREATININE 0.9 0.4 - 1.2 mg/dL    BUN 18 7 - 22 mg/dL    Sodium 141 135 - 145 meq/L    Potassium reflex Magnesium 4.1 3.5 - 5.2 meq/L    Chloride 105 98 - 111 meq/L    CO2 26 23 - 33 meq/L    Calcium 9.2 8.5 - 10.5 mg/dL    AST 19 5 - 40 U/L    Alkaline Phosphatase 89 38 - 126 U/L    Total Protein 7.0 6.1 - 8.0 g/dL    Albumin 4.1 3.5 - 5.1 g/dL    Total Bilirubin 0.5 0.3 - 1.2 mg/dL    ALT 13 11 - 66 U/L   Troponin    Collection Time: 10/12/21  9:18 PM   Result Value Ref Range    Troponin T < 0.010 ng/ml   Protime-INR    Collection Time: 10/12/21  9:18 PM   Result Value Ref Range    INR 3.13 (H) 0.85 - 1.13   APTT    Collection Time: 10/12/21  9:18 PM   Result Value Ref Range    aPTT 45.2 (H) 22.0 - 38.0 seconds   Anion Gap    Collection Time: 10/12/21  9:18 PM   Result Value Ref Range    Anion Gap 10.0 8.0 - 16.0 meq/L   Osmolality    Collection Time: 10/12/21  9:18 PM   Result Value Ref Range    Osmolality Calc 283.5 275.0 - 300.0 mOsmol/kg   Glomerular Filtration Rate, Estimated    Collection Time: 10/12/21  9:18 PM   Result Value Ref Range    Est, Glom Filt Rate 60 (A) ml/min/1.73m2   Urine with Reflexed Micro    Collection Time: 10/12/21 10:00 PM   Result Value Ref Range    Glucose, Ur NEGATIVE NEGATIVE mg/dl    Bilirubin Urine NEGATIVE NEGATIVE    Ketones, Urine NEGATIVE NEGATIVE    Specific Gravity, Urine 1.026 1.002 - 1.030    Blood, Urine SMALL (A) NEGATIVE    pH, UA 5.5 5.0 - 9.0    Protein, UA NEGATIVE NEGATIVE    Urobilinogen, Urine 0.2 0.0 - 1.0 eu/dl    Nitrite, Urine POSITIVE (A) NEGATIVE    Leukocyte Esterase, Urine MODERATE (A) NEGATIVE    Color, UA YELLOW STRAW-YELLOW    Character, Urine CLEAR CLEAR-SL CLOUD    RBC, UA 3-5 0-2/hpf /hpf    WBC, UA 15-25 0-4/hpf /hpf    Epithelial Cells, UA NONE SEEN 3-5/hpf /hpf    Bacteria, UA FEW FEW/NONE SEEN /hpf    Casts UA NONE SEEN NONE SEEN /lpf    Crystals, UA NONE SEEN NONE SEEN    Renal Epithelial, UA NONE SEEN NONE SEEN    Yeast, UA NONE SEEN NONE SEEN    CASTS 2 NONE SEEN NONE SEEN /lpf    MISCELLANEOUS 2 NONE SEEN    Culture, Reflexed, Urine    Collection Time: 10/12/21 10:00 PM    Specimen: Urine, clean catch   Result Value Ref Range    Organism enteric gram negative bacilli (A)     Urine Culture Reflex [place] count: >100,000 CFU/mL     Comprehensive Metabolic Panel w/ Reflex to MG    Collection Time: 10/13/21  4:22 AM   Result Value Ref Range    Glucose 95 70 - 108 mg/dL    CREATININE 0.7 0.4 - 1.2 mg/dL    BUN 15 7 - 22 mg/dL    Sodium 143 135 - 145 meq/L    Potassium reflex Magnesium 4.2 3.5 - 5.2 meq/L    Chloride 105 98 - 111 meq/L    CO2 28 23 - 33 meq/L    Calcium 9.0 8.5 - 10.5 mg/dL    AST 22 5 - 40 U/L    Alkaline Phosphatase 85 38 - 126 U/L    Total Protein 6.3 6.1 - 8.0 g/dL    Albumin 3.9 3.5 - 5.1 g/dL    Total Bilirubin 0.6 0.3 - 1.2 mg/dL    ALT 15 11 - 66 U/L   CBC    Collection Time: 10/13/21  4:22 AM   Result Value Ref Range    WBC 6.7 4.8 - 10.8 thou/mm3    RBC 3.98 (L) 4.20 - 5.40 mill/mm3    Hemoglobin 12.5 12.0 - 16.0 gm/dl    Hematocrit 39.9 37.0 - 47.0 %    .3 (H) 81.0 - 99.0 fL    MCH 31.4 26.0 - 33.0 pg    MCHC 31.3 (L) 32.2 - 35.5 gm/dl    RDW-CV 15.0 (H) 11.5 - 14.5 %    RDW-SD 55.7 (H) 35.0 - 45.0 fL    Platelets 892 823 - 890 thou/mm3    MPV 9.9 9.4 - 12.4 fL   Anion Gap    Collection Time: 10/13/21  4:22 AM   Result Value Ref Range    Anion Gap 10.0 8.0 - 16.0 meq/L   Glomerular Filtration Rate, Estimated    Collection Time: 10/13/21  4:22 AM   Result Value Ref Range    Est, Glom Filt Rate 80 (A) ml/min/1.73m2   Osmolality    Collection Time: 10/13/21  4:22 AM   Result Value Ref Range    Osmolality Calc 285.6 275.0 - 300.0 mOsmol/kg   Protime-INR    Collection Time: 10/13/21  8:14 AM   Result Value Ref Range    INR 2.85 (H) 0.85 - 1.13   Protime-INR    Collection Time: 10/14/21  4:04 AM   Result Value Ref Range    INR 2.52 (H) 0.85 - 1.13       Discharge condition: fair  Disposition: Long-Term Acute Care  Time spent on discharge: 45 minutes    Electronically signed by Florian Harper DO on 10/14/2021 at 7:13 AM

## 2021-10-14 NOTE — CARE COORDINATION
10/14/21, 10:24 AM EDT    Patient goals/plan/ treatment preferences discussed by  and . Patient goals/plan/ treatment preferences reviewed with patient/ family. Patient/ family verbalize understanding of discharge plan and are in agreement with goal/plan/treatment preferences. Understanding was demonstrated using the teach back method. AVS provided by RN at time of discharge, which includes all necessary medical information pertaining to the patients current course of illness, treatment, post-discharge goals of care, and treatment preferences. IMM Letter  IMM Letter given to Patient/Family/Significant other/Guardian/POA/by[de-identified] staff  IMM Letter date given[de-identified] 10/13/21     Planning discharge today, sw notified Francesca Castillo at [facility], [facility] to transport and nurse to call report. Pt returning under skilled Medicare benefit.

## 2021-10-14 NOTE — PROGRESS NOTES
Clinical Pharmacy Note    Warfarin consult follow-up    Recent Labs     10/14/21  0404   INR 2.52*     Recent Labs     10/12/21  2118 10/13/21  0422   HGB 12.9 12.5   HCT 41.0 39.9    148       Significant Drug-Drug Interactions:  New warfarin drug-drug interactions: none  Discontinued drug-drug interactions: lovenox  Current warfarin drug-drug interactions: none     Date INR Warfarin Dose   10/13/21 2.85 3mg   10/14/21   2.52  3mg    10/15/21  -  2.5mg                                     Notes:                   PT/INR or POC-INR will be drawn 10/16/21 and monitored routinely until therapeutic INR is achieved.     Paul Jenkins, DavidD

## 2021-10-14 NOTE — PROGRESS NOTES
indicated. Time of Initial Imaging Read: 9:12 PM  Thrombectomy performed: Not indicated. Puncture time: Not indicated. Interval history 10/13/2021:  Doing well. Looks better than last couple weeks of per daughter. No headaches, lightheadedness, weakness, numbness, dizziness, vision changes. No further episodes of unresponsiveness. Interval history 10/14/2021:  - NAEON     Review of Systems   Review of Systems   Eyes: Negative for visual disturbance. Neurological: Negative for dizziness, weakness, numbness and headaches. Chest: denies chest pain, palpitations  Abdomen: denies pain, diarrhea, vomiting     Past History    Past Medical History:   has a past medical history of AR (allergic rhinitis), Atrial fibrillation ([address] Cancer ([facility] [address] Cancer ([facility] [address] GERD (gastroesophageal reflux disease), History of blood transfusion, Hx of blood clots, Hyperlipidemia, Hypertension, MI, old, and Unspecified cerebral artery occlusion with cerebral infarction. Social History:   reports that she quit smoking about 51 years ago. Her smoking use included cigarettes. She smoked 0.25 packs per day. She has never used smokeless tobacco. She reports that she does not drink alcohol and does not use drugs. Family History:   Family History   Problem Relation Age of Onset    Stroke Mother     Arthritis Father     Cancer Sister         ovarian    Cancer Brother         pancreatic    Diabetes Sister     Arthritis Sister     Heart Disease Sister        Physical Examination        Vitals:  /63   Pulse 69   Temp 98 °F (36.7 °C) (Oral)   Resp 18   Ht 5' 5\" (1.651 m)   Wt 145 lb 3.2 oz (65.9 kg)   SpO2 99%   BMI 24.16 kg/m²   Temp (24hrs), Av.6 °F (36.4 °C), Min:96 °F (35.6 °C), Max:98.2 °F (36.8 °C)      I/O (24Hr):     Intake/Output Summary (Last 24 hours) at 10/14/2021 1858  Last data filed at 10/14/2021 0919  Gross per 24 hour   Intake 355.16 ml   Output --   Net 355.16 ml       Physical Exam  Vitals reviewed. Constitutional:       General: She is not in acute distress. Appearance: Normal appearance. She is not ill-appearing. HENT:      Head: Normocephalic and atraumatic. Right Ear: External ear normal.      Left Ear: External ear normal.      Nose: Nose normal.      Mouth/Throat:      Mouth: Mucous membranes are moist.      Pharynx: No oropharyngeal exudate or posterior oropharyngeal erythema. Eyes:      Extraocular Movements: EOM normal.      Pupils: Pupils are equal, round, and reactive to light. Cardiovascular:      Rate and Rhythm: Tachycardia present. Rhythm irregular. Heart sounds: Normal heart sounds. No murmur heard. Pulmonary:      Effort: Pulmonary effort is normal. No respiratory distress. Breath sounds: Normal breath sounds. No wheezing. Abdominal:      General: Bowel sounds are normal.      Palpations: Abdomen is soft. Tenderness: There is no abdominal tenderness. Musculoskeletal:         General: Normal range of motion. Right lower leg: No edema. Left lower leg: No edema. Skin:     General: Skin is warm. Findings: No rash. Neurological:      Mental Status: She is alert. Coordination: Finger-Nose-Finger Test and Heel to [place] jay Test normal.      Deep Tendon Reflexes:      Reflex Scores:       Bicep reflexes are 2+ on the right side and 2+ on the left side. Brachioradialis reflexes are 2+ on the right side and 2+ on the left side. Patellar reflexes are 2+ on the right side and 2+ on the left side. Psychiatric:         Mood and Affect: Mood normal.         Speech: Speech normal.         Behavior: Behavior normal.       Neurologic Exam     Mental Status   Oriented to person. Disoriented to place. Disoriented to time. Attention: decreased. Concentration: decreased. Speech: speech is normal   Level of consciousness: alert  Normal comprehension.      Cranial Nerves     CN II   Visual fields full to confrontation. Right visual field deficit: none  Left visual field deficit: none     CN III, IV, VI   Pupils are equal, round, and reactive to light. Extraocular motions are normal.   Right pupil: Shape: regular. Reactivity: brisk. Left pupil: Shape: regular. Reactivity: brisk. CN V   Facial sensation intact. Right facial sensation deficit: none  Left facial sensation deficit: none    CN VII   Facial expression full, symmetric.    Right facial weakness: none  Left facial weakness: none    CN VIII   CN VIII normal.   Hearing: intact    CN IX, X   CN IX normal.   CN X normal.   Palate: symmetric    CN XI   CN XI normal.   Right trapezius strength: normal  Left trapezius strength: normal    CN XII   CN XII normal.   Tongue: not atrophic  Fasciculations: absent  Tongue deviation: none    Motor Exam   Muscle bulk: normal  Overall muscle tone: normal  Right arm tone: normal  Left arm tone: normal  Right arm pronator drift: absent  Left arm pronator drift: absent  Right leg tone: normal  Left leg tone: normal  Muscle strength 5/5 in bilateral upper and lower extremities     Sensory Exam   Light touch normal.     Gait, Coordination, and Reflexes     Coordination   Finger to nose coordination: normal  Heel to shin coordination: normal    Reflexes   Right brachioradialis: 2+  Left brachioradialis: 2+  Right biceps: 2+  Left biceps: 2+  Right patellar: 2+  Left patellar: 2+       Labs/Imaging/Diagnostics   Labs:  CBC:  Recent Labs     10/12/21  2118 10/13/21  0422 10/14/21  0803   WBC 7.4 6.7 6.1   RBC 4.12* 3.98* 4.14*   HGB 12.9 12.5 12.9   HCT 41.0 39.9 41.8   MCV 99.5* 100.3* 101.0*    148 167     CHEMISTRIES:  Recent Labs     10/12/21  2118 10/13/21  0422    143   K 4.1 4.2    105   CO2 26 28   BUN 18 15   CREATININE 0.9 0.7   GLUCOSE 105 95     PT/INR:  Recent Labs     10/12/21  2118 10/13/21  0814 10/14/21  0404   INR 3.13* 2.85* 2.52*     APTT:  Recent Labs     10/12/21  2118   APTT 45.2* LIVER PROFILE:  Recent Labs     10/12/21  2118 10/13/21  0422   AST 19 22   ALT 13 15   BILITOT 0.5 0.6   ALKPHOS 89 85       Imaging Last 24 Hours:  CT HEAD WO CONTRAST    Result Date: 10/12/2021  PROCEDURE: CT HEAD WO CONTRAST CLINICAL INFORMATION: Cerebrovascular accident TECHNIQUE: CT scan of the head was performed from the vertex to the skull base without use of intravenous contrast. Axial images as well as coronal and sagittal reconstructions were obtained. All CT scans at this facility use dose modulation, iterative reconstruction, and/or weight-based dosing when appropriate to reduce radiation dose to as low as reasonably achievable. COMPARISON: None. FINDINGS: There is no mass effect, midline shift or acute hemorrhage. Ventricles and sulci are prominent. There is diffuse periventricular white matter hypoattenuation. There is an area of encephalomalacia extending from the right parietal and temporal lobes into the right occipital lobe. Visualized orbits, paranasal sinuses and mastoid air cells are unremarkable. 1. No mass effect or acute hemorrhage. 2. Chronic periventricular small vessel ischemic changes and cerebral atrophy. 3. Right-sided encephalomalacia, likely secondary to prior infarct. If there is clinical concern for an evolving infarct, brain MRI is recommended for further evaluation. **This report has been created using voice recognition software. It may contain minor errors which are inherent in voice recognition technology. ** Final report electronically signed by Dr. Gwendolyn Cota on 10/12/2021 9:12 PM      Assessment and Plan:          1. AMS, improving, stroke alert  · CT and CTA head and neck negative for any acute findings. Patient was not a candidate for TPA due to lack of deficit and not a candidate for endovascular intervention due to lack of deficit and lack of LVO was seen on CT angiogram  · During her ER course, her mentation improved and she became more alert and responsive. Her history of 1 hour episode of unresponsiveness is concerning for seizure-like activity. · EEG pending. We will hold off on AED at this time.    · UTI treatment per primary team.  · Due to the resolution of her symptoms, we will not obtain an MRI at this time   · Recommend consult w/ cardiology to discuss CTA findings of L atrial appendage   · Neurology will continue to follow        Discussed w/ Dr. Tom Cutler PABernardC  Interventional Neurology

## 2021-10-14 NOTE — PROGRESS NOTES
Discharge teaching and instructions for diagnosis/procedure of UTI completed with patient's daughter and ECF using teachback method. AVS reviewed with ECF.  Discharged in a wheelchair to  skilled nursing per family

## 2021-10-14 NOTE — PROGRESS NOTES
[address]  PHYSICAL THERAPY MISSED TREATMENT NOTE  [facility] NEUROSCIENCES 4A    Date: 10/14/2021  Patient Name: Giovana Dickerson        MRN: [id number]   : 1935  (82 y.o.)  Gender: female   Referring Practitioner: Medina Paiz MD  Diagnosis: acute cystitis with hematuria         REASON FOR MISSED TREATMENT:  Patient unable to participate. Attempted to see pt x2 this AM, pt getting EEG completed.      Linda Roth PT, DPT

## 2021-10-15 PROCEDURE — 95816 EEG AWAKE AND DROWSY: CPT | Performed by: PSYCHIATRY & NEUROLOGY

## 2021-10-15 NOTE — PROCEDURES
[address] [place] [address]                          ELECTROENCEPHALOGRAM REPORT    PATIENT NAME: Indio Huynh                  :        1935  MED REC NO:   [id number]                           ROOM:       0016  ACCOUNT NO:   [de-identified]                           ADMIT DATE: 10/12/2021  PROVIDER:     Tatiana Whittington. Andre Favre, MD    DATE OF EEG:  10/14/2021    REFERRING PROVIDER:  Oscar Milner MD    CLINICAL HISTORY:  An 82-year-old female presenting with unresponsive  episode at the nursing home. Medications listed, none. CLINICAL INTERPRETATION:  This is a 17-channel EEG performed without  sleep deprivation. Hyperventilation and photic stimulation was not  performed. The patient is described as alert. The background rhythm activity is noted to be 7 Hz in the posterior  parietal area, symmetric. Excessive slowing was seen in The theta range  suggestive of cortical dysfunction such as seen with encephalopathy. Lead and muscle artifacts were noted limiting quality of data obtained. The patient was noted to be drowsy during parts of recording. The  patient was noted to be asleep during parts of recording. No clinical  seizures were observed. IMPRESSION:  This is an abnormal EEG due to the excessive slowing seen  in the theta range suggestive of mild cortical dysfunction such as seen  with metabolic causes, medication effects or nonspecific encephalopathy. There was no definitive evidence of epileptiform activity appreciated.           Chilo Nathan MD    D: 10/15/2021 11:59:50       T: 10/15/2021 12:03:29     [email or web address]  Job#: [id number]     Doc#: [id number]    CC:

## 2022-05-20 ENCOUNTER — HOSPITAL ENCOUNTER (EMERGENCY)
Age: 87
Discharge: HOME | End: 2022-05-20
Attending: EMERGENCY MEDICINE
Payer: MEDICARE

## 2022-05-20 ENCOUNTER — APPOINTMENT (OUTPATIENT)
Dept: GENERAL RADIOLOGY | Age: 87
End: 2022-05-20
Payer: MEDICARE

## 2022-05-20 ENCOUNTER — ANCILLARY PROCEDURE (OUTPATIENT)
Dept: EMERGENCY DEPT | Age: 87
End: 2022-05-20
Payer: MEDICARE

## 2022-05-20 ENCOUNTER — APPOINTMENT (OUTPATIENT)
Dept: CT IMAGING | Age: 87
End: 2022-05-20
Payer: MEDICARE

## 2022-05-20 VITALS
BODY MASS INDEX: 21.66 KG/M2 | WEIGHT: 130 LBS | DIASTOLIC BLOOD PRESSURE: 100 MMHG | TEMPERATURE: 98.4 F | SYSTOLIC BLOOD PRESSURE: 139 MMHG | OXYGEN SATURATION: 93 % | HEART RATE: 98 BPM | RESPIRATION RATE: 19 BRPM | HEIGHT: 65 IN

## 2022-05-20 DIAGNOSIS — S01.01XA LACERATION OF SCALP, INITIAL ENCOUNTER: ICD-10-CM
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
## 2022-05-20 DIAGNOSIS — N39.0 ACUTE UTI: ICD-10-CM

## 2022-05-20 PROBLEM — S00.03XA TRAUMATIC HEMATOMA OF SCALP: Status: ACTIVE | Noted: 2022-05-20

## 2022-05-20 LAB
ALBUMIN SERPL-MCNC: 4.2 G/DL (ref 3.5–5.1)
ALP BLD-CCNC: 71 U/L (ref 38–126)
ALT SERPL-CCNC: 14 U/L (ref 11–66)
AMPHETAMINE+METHAMPHETAMINE URINE SCREEN: NEGATIVE
ANION GAP SERPL CALCULATED.3IONS-SCNC: 8 MEQ/L (ref 8–16)
APTT: 30.8 SECONDS (ref 22–38)
AST SERPL-CCNC: 20 U/L (ref 5–40)
BACTERIA: ABNORMAL /HPF
BARBITURATE QUANTITATIVE URINE: NEGATIVE
BASOPHILS # BLD: 0.3 %
BASOPHILS ABSOLUTE: 0 THOU/MM3 (ref 0–0.1)
BENZODIAZEPINE QUANTITATIVE URINE: NEGATIVE
BILIRUB SERPL-MCNC: 1 MG/DL (ref 0.3–1.2)
BILIRUBIN URINE: NEGATIVE
BLOOD, URINE: ABNORMAL
BUN BLDV-MCNC: 16 MG/DL (ref 7–22)
CALCIUM SERPL-MCNC: 8.9 MG/DL (ref 8.5–10.5)
CANNABINOID QUANTITATIVE URINE: NEGATIVE
CASTS 2: ABNORMAL /LPF
CASTS UA: ABNORMAL /LPF
CHARACTER, URINE: CLEAR
CHLORIDE BLD-SCNC: 104 MEQ/L (ref 98–111)
CO2: 27 MEQ/L (ref 23–33)
COCAINE METABOLITE QUANTITATIVE URINE: NEGATIVE
COLOR: YELLOW
CREAT SERPL-MCNC: 0.6 MG/DL (ref 0.4–1.2)
CRYSTALS, UA: ABNORMAL
EOSINOPHIL # BLD: 0.7 %
EOSINOPHILS ABSOLUTE: 0.1 THOU/MM3 (ref 0–0.4)
EPITHELIAL CELLS, UA: ABNORMAL /HPF
ERYTHROCYTE [DISTWIDTH] IN BLOOD BY AUTOMATED COUNT: 13.6 % (ref 11.5–14.5)
ERYTHROCYTE [DISTWIDTH] IN BLOOD BY AUTOMATED COUNT: 51.8 FL (ref 35–45)
ETHYL ALCOHOL, SERUM: < 0.01 %
GFR SERPL CREATININE-BSD FRML MDRD: > 90 ML/MIN/1.73M2
GLUCOSE BLD-MCNC: 104 MG/DL (ref 70–108)
GLUCOSE BLD-MCNC: 110 MG/DL (ref 70–108)
GLUCOSE URINE: NEGATIVE MG/DL
HCT VFR BLD CALC: 36.3 % (ref 37–47)
HEMOGLOBIN: 11.4 GM/DL (ref 12–16)
IMMATURE GRANS (ABS): 0.06 THOU/MM3 (ref 0–0.07)
IMMATURE GRANULOCYTES: 0.7 %
INR BLD: 1.3 (ref 0.85–1.13)
KETONES, URINE: NEGATIVE
LEUKOCYTE ESTERASE, URINE: ABNORMAL
LYMPHOCYTES # BLD: 12.2 %
LYMPHOCYTES ABSOLUTE: 1.1 THOU/MM3 (ref 1–4.8)
MAGNESIUM: 1.9 MG/DL (ref 1.6–2.4)
MCH RBC QN AUTO: 32.1 PG (ref 26–33)
MCHC RBC AUTO-ENTMCNC: 31.4 GM/DL (ref 32.2–35.5)
MCV RBC AUTO: 102.3 FL (ref 81–99)
MISCELLANEOUS 2: ABNORMAL
MONOCYTES # BLD: 8.7 %
MONOCYTES ABSOLUTE: 0.8 THOU/MM3 (ref 0.4–1.3)
NITRITE, URINE: POSITIVE
NUCLEATED RED BLOOD CELLS: 0 /100 WBC
OPIATES, URINE: NEGATIVE
OSMOLALITY CALCULATION: 279.4 MOSMOL/KG (ref 275–300)
OXYCODONE: NEGATIVE
PH UA: 8.5 (ref 5–9)
PHENCYCLIDINE QUANTITATIVE URINE: NEGATIVE
PLATELET # BLD: 137 THOU/MM3 (ref 130–400)
PMV BLD AUTO: 9.6 FL (ref 9.4–12.4)
POTASSIUM SERPL-SCNC: 4.3 MEQ/L (ref 3.5–5.2)
PROTEIN UA: ABNORMAL
RBC # BLD: 3.55 MILL/MM3 (ref 4.2–5.4)
RBC URINE: ABNORMAL /HPF
RENAL EPITHELIAL, UA: ABNORMAL
SEG NEUTROPHILS: 77.4 %
SEGMENTED NEUTROPHILS ABSOLUTE COUNT: 7 THOU/MM3 (ref 1.8–7.7)
SODIUM BLD-SCNC: 139 MEQ/L (ref 135–145)
SPECIFIC GRAVITY, URINE: 1.01 (ref 1–1.03)
TOTAL PROTEIN: 7.2 G/DL (ref 6.1–8)
TROPONIN T: < 0.01 NG/ML
TSH SERPL DL<=0.05 MIU/L-ACNC: 1.7 UIU/ML (ref 0.4–4.2)
UROBILINOGEN, URINE: 1 EU/DL (ref 0–1)
WBC # BLD: 9 THOU/MM3 (ref 4.8–10.8)
WBC UA: ABNORMAL /HPF
YEAST: ABNORMAL

## 2022-05-20 PROCEDURE — 90715 TDAP VACCINE 7 YRS/> IM: CPT | Performed by: EMERGENCY MEDICINE

## 2022-05-20 PROCEDURE — 12001 RPR S/N/AX/GEN/TRNK 2.5CM/<: CPT

## 2022-05-20 PROCEDURE — 84484 ASSAY OF TROPONIN QUANT: CPT

## 2022-05-20 PROCEDURE — 82948 REAGENT STRIP/BLOOD GLUCOSE: CPT

## 2022-05-20 PROCEDURE — 85730 THROMBOPLASTIN TIME PARTIAL: CPT

## 2022-05-20 PROCEDURE — 82077 ASSAY SPEC XCP UR&BREATH IA: CPT

## 2022-05-20 PROCEDURE — 6360000002 HC RX W HCPCS: Performed by: EMERGENCY MEDICINE

## 2022-05-20 PROCEDURE — 87186 SC STD MICRODIL/AGAR DIL: CPT

## 2022-05-20 PROCEDURE — 85025 COMPLETE CBC W/AUTO DIFF WBC: CPT

## 2022-05-20 PROCEDURE — 6360000002 HC RX W HCPCS

## 2022-05-20 PROCEDURE — 81001 URINALYSIS AUTO W/SCOPE: CPT

## 2022-05-20 PROCEDURE — 71045 X-RAY EXAM CHEST 1 VIEW: CPT

## 2022-05-20 PROCEDURE — 2580000003 HC RX 258: Performed by: EMERGENCY MEDICINE

## 2022-05-20 PROCEDURE — 3209999900 POC US FAST ABDOMEN LIMITED

## 2022-05-20 PROCEDURE — 84443 ASSAY THYROID STIM HORMONE: CPT

## 2022-05-20 PROCEDURE — 96361 HYDRATE IV INFUSION ADD-ON: CPT

## 2022-05-20 PROCEDURE — 6820000002 HC L2 INJURY CALL ACTIVATION: Performed by: SURGERY

## 2022-05-20 PROCEDURE — 80053 COMPREHEN METABOLIC PANEL: CPT

## 2022-05-20 PROCEDURE — 87077 CULTURE AEROBIC IDENTIFY: CPT

## 2022-05-20 PROCEDURE — 83735 ASSAY OF MAGNESIUM: CPT

## 2022-05-20 PROCEDURE — 72125 CT NECK SPINE W/O DYE: CPT

## 2022-05-20 PROCEDURE — 96374 THER/PROPH/DIAG INJ IV PUSH: CPT

## 2022-05-20 PROCEDURE — 99285 EMERGENCY DEPT VISIT HI MDM: CPT

## 2022-05-20 PROCEDURE — 87086 URINE CULTURE/COLONY COUNT: CPT

## 2022-05-20 PROCEDURE — 73030 X-RAY EXAM OF SHOULDER: CPT

## 2022-05-20 PROCEDURE — 90471 IMMUNIZATION ADMIN: CPT | Performed by: EMERGENCY MEDICINE

## 2022-05-20 PROCEDURE — 36415 COLL VENOUS BLD VENIPUNCTURE: CPT

## 2022-05-20 PROCEDURE — 85610 PROTHROMBIN TIME: CPT

## 2022-05-20 PROCEDURE — 6360000002 HC RX W HCPCS: Performed by: STUDENT IN AN ORGANIZED HEALTH CARE EDUCATION/TRAINING PROGRAM

## 2022-05-20 PROCEDURE — 2500000003 HC RX 250 WO HCPCS: Performed by: EMERGENCY MEDICINE

## 2022-05-20 PROCEDURE — 99283 EMERGENCY DEPT VISIT LOW MDM: CPT | Performed by: SURGERY

## 2022-05-20 PROCEDURE — 96375 TX/PRO/DX INJ NEW DRUG ADDON: CPT

## 2022-05-20 PROCEDURE — 72170 X-RAY EXAM OF PELVIS: CPT

## 2022-05-20 PROCEDURE — APPSS180 APP SPLIT SHARED TIME > 60 MINUTES: Performed by: NURSE PRACTITIONER

## 2022-05-20 PROCEDURE — 80307 DRUG TEST PRSMV CHEM ANLYZR: CPT

## 2022-05-20 PROCEDURE — 70450 CT HEAD/BRAIN W/O DYE: CPT

## 2022-05-20 RX ORDER — FENTANYL CITRATE 50 UG/ML
25 INJECTION, SOLUTION INTRAMUSCULAR; INTRAVENOUS ONCE
Status: COMPLETED | OUTPATIENT
Start: 2022-05-20 | End: 2022-05-20

## 2022-05-20 RX ORDER — 0.9 % SODIUM CHLORIDE 0.9 %
1000 INTRAVENOUS SOLUTION INTRAVENOUS ONCE
Status: COMPLETED | OUTPATIENT
Start: 2022-05-20 | End: 2022-05-20

## 2022-05-20 RX ORDER — MORPHINE SULFATE 2 MG/ML
2 INJECTION, SOLUTION INTRAMUSCULAR; INTRAVENOUS ONCE
Status: DISCONTINUED | OUTPATIENT
Start: 2022-05-20 | End: 2022-05-20

## 2022-05-20 RX ORDER — AMOXICILLIN AND CLAVULANATE POTASSIUM 875; 125 MG/1; MG/1
1 TABLET, FILM COATED ORAL 2 TIMES DAILY
Qty: 14 TABLET | Refills: 0 | Status: SHIPPED | OUTPATIENT
Start: 2022-05-20 | End: 2022-05-20 | Stop reason: SDUPTHER

## 2022-05-20 RX ORDER — METOPROLOL TARTRATE 5 MG/5ML
5 INJECTION INTRAVENOUS ONCE
Status: COMPLETED | OUTPATIENT
Start: 2022-05-20 | End: 2022-05-20

## 2022-05-20 RX ORDER — AMOXICILLIN AND CLAVULANATE POTASSIUM 875; 125 MG/1; MG/1
1 TABLET, FILM COATED ORAL 2 TIMES DAILY
Qty: 14 TABLET | Refills: 0 | Status: SHIPPED | OUTPATIENT
Start: 2022-05-20 | End: 2022-05-27

## 2022-05-20 RX ORDER — FENTANYL CITRATE 50 UG/ML
INJECTION, SOLUTION INTRAMUSCULAR; INTRAVENOUS
Status: COMPLETED
Start: 2022-05-20 | End: 2022-05-20

## 2022-05-20 RX ADMIN — TETANUS TOXOID, REDUCED DIPHTHERIA TOXOID AND ACELLULAR PERTUSSIS VACCINE, ADSORBED 0.5 ML: 5; 2.5; 8; 8; 2.5 SUSPENSION INTRAMUSCULAR at 09:23

## 2022-05-20 RX ADMIN — FENTANYL CITRATE 25 MCG: 50 INJECTION, SOLUTION INTRAMUSCULAR; INTRAVENOUS at 07:33

## 2022-05-20 RX ADMIN — FENTANYL CITRATE 25 MCG: 50 INJECTION, SOLUTION INTRAMUSCULAR; INTRAVENOUS at 07:40

## 2022-05-20 RX ADMIN — METOPROLOL TARTRATE 5 MG: 1 INJECTION, SOLUTION INTRAVENOUS at 09:41

## 2022-05-20 RX ADMIN — SODIUM CHLORIDE 1000 ML: 9 INJECTION, SOLUTION INTRAVENOUS at 07:40

## 2022-05-20 ASSESSMENT — ENCOUNTER SYMPTOMS
WHEEZING: 0
ABDOMINAL DISTENTION: 0
CHOKING: 0
EYE PAIN: 0
TROUBLE SWALLOWING: 0
FACIAL SWELLING: 0
COLOR CHANGE: 0
PHOTOPHOBIA: 0
EYE REDNESS: 0
VOMITING: 0
DIARRHEA: 0
BACK PAIN: 0
SINUS PRESSURE: 0
SINUS PAIN: 0
APNEA: 0
RHINORRHEA: 0
COUGH: 0
SORE THROAT: 0
ABDOMINAL PAIN: 0
CONSTIPATION: 0
SHORTNESS OF BREATH: 0
VOICE CHANGE: 0
EYE DISCHARGE: 0
CHEST TIGHTNESS: 0
BLOOD IN STOOL: 0
EYE ITCHING: 0
NAUSEA: 0
STRIDOR: 0

## 2022-05-20 NOTE — ED NOTES
Pt to CT at this time in stable condition accompanied by this nurse     Corey Gonzalez RN  05/20/22 9204

## 2022-05-20 NOTE — ED PROVIDER NOTES
PATIENT NAME: Ivan Jauregui  MRN: [id number]  : 1935  MORAN: 2022      I personally saw and examined the patient. I have reviewed and agreed with the resident physician's findings including all diagnostic interpretations and treatment plans as written. Please see resident physician's chart for detailed history of present illness, physical exam and medical decision making. I was present for the key portions of any procedures performed and the inclusive time noted in any critical care statement. MEDICAL DEDISION MAKING (MDM)     Ivan Jauregui is a 82 y.o. female who presents to Emergency Department with Fall and Trauma     · She was brought in by EMS from SNF after a fall, she sustained a scalp hematoma and laceration. Past medical history significant of advanced dementia and atrial fibrillation, currently on Eliquis. Fall was unwitnessed. Patient fell backwards. No LOC. On arrival she complains headache and right shoulder pain. · Level 2 trauma activation on arrival.  · Primary survey unremarkable. · Portable chest and pelvis x-ray negative for acute injury. CXR shows mild pulmonary congestion. ED FAST negative. · Exam: AVSS. Lungs CTAB. Heart irregular rhythm, slightly tachycardic. Abdomen is soft. Neurologically intact. 1 cm occipital lac with scalp hematoma which was repaired by stapler. · CTs head and cervical spine negative for acute injury. Right shoulder x-ray neg for acute injury. · Labs are reassuring. UA has UTI  · Chronic Afib with VR around 100s. · She is able to ambulate using a walker with assistance. Daughter states patient seems back to her base line. · Discharged back to SNF. · PCP  Follow up in 3 days. Staples out in 5 days.           Vitals:    22 0832 22 0926 22 0933 22 1045   BP: (!) 148/96 131/76 (!) 140/87 (!) 139/100   Pulse: 109 102 108 98   Resp:    Temp:       SpO2: 92% 94% 94% 93%   Weight:       Height: Labs Reviewed   CBC WITH AUTO DIFFERENTIAL - Abnormal; Notable for the following components:       Result Value    RBC 3.55 (*)     Hemoglobin 11.4 (*)     Hematocrit 36.3 (*)     .3 (*)     MCHC 31.4 (*)     RDW-SD 51.8 (*)     All other components within normal limits   COMPREHENSIVE METABOLIC PANEL - Abnormal; Notable for the following components:    Glucose 110 (*)     All other components within normal limits   PROTIME-INR - Abnormal; Notable for the following components:    INR 1.30 (*)     All other components within normal limits   URINE WITH REFLEXED MICRO - Abnormal; Notable for the following components:    Blood, Urine TRACE (*)     Protein, UA TRACE (*)     Nitrite, Urine POSITIVE (*)     Leukocyte Esterase, Urine SMALL (*)     All other components within normal limits   CULTURE, REFLEXED, URINE    Narrative:     Source: urine, clean catch       Site:           Current Antibiotics: not stated   APTT   ETHANOL   URINE DRUG SCREEN   TROPONIN   TSH WITH REFLEX   MAGNESIUM   ANION GAP   GLOMERULAR FILTRATION RATE, ESTIMATED   OSMOLALITY   POCT GLUCOSE       CT HEAD WO CONTRAST   Final Result   1. No mass effect or acute hemorrhage. 2. Chronic periventricular small vessel ischemic changes and cerebral atrophy. **This report has been created using voice recognition software. It may contain minor errors which are inherent in voice recognition technology. **      Final report electronically signed by Dr. Madalyn Jaquez on 5/20/2022 8:18 AM      CT CERVICAL SPINE WO CONTRAST   Final Result   1. No acute fracture or spondylolisthesis of the cervical spine. 2. Multilevel degenerative disc disease, neural foraminal narrowing and central canal stenosis. 3. Compression deformities of the T4 and T5 vertebra, likely chronic. Final report electronically signed by Dr. Madalyn Jaquez on 5/20/2022 8:20 AM      XR CHEST PORTABLE   Final Result   Cardiomegaly.  Mild pulmonary vascular cephalization. **This report has been created using voice recognition software. It may contain minor errors which are inherent in voice recognition technology. **      Final report electronically signed by Dr. David Rondon on 5/20/2022 8:04 AM      XR SHOULDER RIGHT (MIN 2 VIEWS)   Final Result   No acute process identified. Limited exam.            **This report has been created using voice recognition software. It may contain minor errors which are inherent in voice recognition technology. **      Final report electronically signed by Dr. David Rondon on 5/20/2022 8:01 AM      XR PELVIS (1-2 VIEWS)   Final Result   No gross fracture. If clinical concern remains for fracture CT would be recommended            **This report has been created using voice recognition software. It may contain minor errors which are inherent in voice recognition technology. **      Final report electronically signed by Dr. David Rondon on 5/20/2022 8:00 AM      US Ed Fast Abdomen Limited    (Results Pending)         FINAL IMPRESSION AND DISPOSITION      1. Fall, initial encounter    2. Laceration of scalp, initial encounter    3.  Acute UTI        DISPOSITION Decision To Discharge 05/20/2022 11:37:23 AM        PATIENT REFERRED TO:  Inder Tran MD  [address]  [phone]    Schedule an appointment as soon as possible for a visit in 3 days  As needed    Inder Tran MD  [address]  [phone]    Schedule an appointment as soon as possible for a visit   For suture removal      DISCHARGE MEDICATIONS:  Current Discharge Medication List      START taking these medications    Details   amoxicillin-clavulanate (AUGMENTIN) 875-125 MG per tablet Take 1 tablet by mouth 2 times daily for 7 days  Qty: 14 tablet, Refills: 0             (Please note that portions of this note were completed with a voice recognition program.  Efforts were made to edit the dictations but occasionally words aremis-transcribed.)    MD Zach Capellan MD  05/20/22 1214       Zach Wynne MD  05/20/22 2027

## 2022-05-20 NOTE — ED NOTES
Pt to ER via [address] from [facility] due to fall. EMS reports that pt fell out of bed and hit the back of her head. Pt is on elliquis and has small laceration on back of head. En route EMS applied dressing. Upon arrival to the ER, pt is alert with history of dementia. She is grabbing at right shoulder. Bleeding controlled to wound on back of head.       Nancy Salgado RN  05/20/22 3369

## 2022-05-20 NOTE — ED NOTES
This nurse and Maria Eugenia Pierce RN attempted to straight cath pt with no success at this time     Richard Baxter RN  05/20/22 0977

## 2022-05-20 NOTE — CONSULTS
I have independently performed an evaluation on Kristi . I have reviewed the above documentation completed by the APPRN, Rudy Sears . Italicized font, if present, represents changes to the note made by me. Time spent with patient 40 minutes. Time could have been discontiguous. Time does not include procedures. Time does include my direct assessment of the patient and coordination of care. Time represents more than 50% of the time involved with patient care by the [address] [place] [address] team.      Patient resides in a nursing home fell while on blood thinners, hit her head and has a subdural hematoma that is controlled. She also complains of right shoulder pain. Imaging all negative. She has full range of motion on exam.  Hematoma is no longer bleeding. All images negative for traumatic work-up necessitating admission. Patient stable for discharge from the ER and this medicine feels needed admission for medical issues. Electronically signed by Taylor Longorai MD on 5/21/2022 at 9:43 AM      Trauma Consult     Patient:  Linnea Partida date: 5/20/2022   YOB: 1935 Date of Evaluation: 5/20/2022  MRN: [id number]  Acct: [de-identified]    Injury Date:5/20/2022  Injury time:PTA  PCP: Sakina Blue MD   Referring physician: Dr. Glenda Easton    Time of Trauma Surgeon Notification:  2016  Time of Trauma JELANI Arrival: 0732  Time of Trauma Surgeon Arrival:  [phone] Requested Within 30 Minutes: None   Time Contacted:N/A    Assessment:    Trauma by fall  Occipital scalp laceration   Traumatic hematoma of scalp   Anticoagulated  Plan:    Imaging of the head, cervical spine, chest, pelvis and right shoulder are negative for acute traumatic injuries. The scalp laceration was closed with 2 staples. Tetanus was updated. From a trauma standpoint, it is OK for the patient to be ambulated in the Emergency Department and discharged back to the [facility] where she resides.   This was discussed with the ER attending. Thank you for the consult. Activation: []Level I (Trauma Alert) [x]Level II (Injury Call) []Level III (Trauma Consult) []Downgraded  Mode of Arrival: EMS transportation  Referring Facility: N/A   Loss of Consciousness [x]No []Yes[]Unknown  Duration(min)  Mechanism of Injury:  []Motor Vehicle crash   []Single Vehicle [] []Passenger []Scene Fatality []Front Seat  []Restrained   []Air Bag Deployed   []Ejected []Rollover []Pedestrian []Trapped   Type of vehicle:   Protective Devices:   []Motorcycle  Wearing Helmet []Yes []No  []Bicycle  Wearing Helmet []Yes []No  [x]Fall   Distance - ground level    []Assault    Abuse Reported []Yes []No  []Gunshot  []Stabbing  []Work Related  []Burn: []Flame []Scald []Electrical []Chemical []Contact []Inhalation []House Fire  []Other:   Patient Active Problem List   Diagnosis    [facility]    Atrial fibrillation ([facility]    History of CVA (cerebrovascular accident)    Acute cystitis with hematuria    Episode of unresponsiveness    Fall    Scalp laceration    Traumatic hematoma of scalp     Subjective   Chief Complaint: Fall    History of Present Illness:  Ashley Hart is an 82year old female with PMH of atrial fibrillation, HTN, HLD and CVA presenting to the Emergency Department via EMS for evaluation of potential injuries sustained in a ground level fall at the [facility] where she resides. Per EMS report, the staff at the [facility] heard Kristi fall and found her on the floor in her room. There was no LOC. She sustained a laceration of approximately 1 cm to the left occipital scalp. She has baseline dementia and per staff report, she is at her baseline with a GCS of 14. She complains of pain in her right shoulder as well as her scalp at the site of the laceration/hematoma. She has no other complaints.         Review of Systems:   Review of Systems   Constitutional: Negative for activity change, appetite change, chills, diaphoresis, fatigue, fever and unexpected weight change. HENT: Negative for congestion, dental problem, drooling, ear discharge, ear pain, facial swelling, hearing loss, mouth sores, nosebleeds, postnasal drip, rhinorrhea, sinus pressure, sneezing, sore throat, tinnitus, trouble swallowing and voice change. Eyes: Negative for photophobia, pain, discharge, redness, itching and visual disturbance. Respiratory: Negative for apnea, cough, choking, chest tightness, shortness of breath, wheezing and stridor. Cardiovascular: Negative for chest pain, palpitations and leg swelling. Gastrointestinal: Negative for abdominal distention, abdominal pain, blood in stool, constipation, diarrhea, nausea and vomiting. Endocrine: Negative for cold intolerance, heat intolerance, polydipsia, polyphagia and polyuria. Genitourinary: Negative for difficulty urinating, dysuria, flank pain, frequency, hematuria and urgency. Musculoskeletal: Positive for arthralgias (Right shoulder pain ) and gait problem (Ambulates with a walker). Negative for back pain, joint swelling, myalgias, neck pain and neck stiffness. Skin: Positive for wound (Occipital scalp laceration ). Negative for color change, pallor and rash. Allergic/Immunologic: Negative for environmental allergies, food allergies and immunocompromised state. Neurological: Negative for dizziness, tremors, seizures, syncope, facial asymmetry, speech difficulty, weakness, light-headedness, numbness and headaches. Hematological: Negative for adenopathy. Bruises/bleeds easily (Takes anticoagulation ). Psychiatric/Behavioral: Negative for agitation, behavioral problems, confusion, decreased concentration, dysphoric mood, hallucinations, self-injury, sleep disturbance and suicidal ideas. The patient is not nervous/anxious and is not hyperactive. Latex, Ciprofloxacin, and Codeine  Past Surgical History:   Procedure Laterality Date    APPENDECTOMY      at age 23 years? ?    CARDIAC CATHETERIZATION  2006??    x2,  OK    COLECTOMY  03/26/2015    Robotic Assisted Laparoscopic--Dr. Betzaida Zavala    COLONOSCOPY  2016??    DENTAL SURGERY  around age 30 Years? ??    full mouth extraction    DILATION AND CURETTAGE OF UTERUS      before hysterectomy    ENDOSCOPY, COLON, DIAGNOSTIC      EYE SURGERY  2010? ??    cataracts, bilateral    HYSTERECTOMY      at age 38 years    MOHS SURGERY  07/14/2017    nose    PRE-MALIGNANT / BENIGN SKIN LESION EXCISION  07/14/2017    chest    SKIN BIOPSY       Past Medical History:   Diagnosis Date    AR (allergic rhinitis)     Atrial fibrillation (HCC)     Cancer ([facility]     skin    Cancer ([facility] 2015    colon  (surgery - no chemo or radiation)    GERD (gastroesophageal reflux disease)     History of blood transfusion at age 38 years    before hysterectomy    Hx of blood clots 2006??    brain    Hyperlipidemia     Hypertension     MI, old     Unspecified cerebral artery occlusion with cerebral infarction 2006    no weakness - left eye deficit (vision)     Past Surgical History:   Procedure Laterality Date    APPENDECTOMY      at age 23 years? ?    CARDIAC CATHETERIZATION  2006??    x2,  OK    COLECTOMY  03/26/2015    Robotic Assisted Laparoscopic--Dr. Betzaida Zavala    COLONOSCOPY  2016??    DENTAL SURGERY  around age 30 Years? ??    full mouth extraction    DILATION AND CURETTAGE OF UTERUS      before hysterectomy    ENDOSCOPY, COLON, DIAGNOSTIC      EYE SURGERY  2010? ??    cataracts, bilateral    HYSTERECTOMY      at age 38 years    MOHS SURGERY  07/14/2017    nose    PRE-MALIGNANT / BENIGN SKIN LESION EXCISION  07/14/2017    chest    SKIN BIOPSY       Social History     Socioeconomic History    Marital status:       Spouse name: Not on file    Number of children: Not on file    Years of education: Not on file    Highest education level: Not on file   Occupational History    Not on file   Tobacco Use    Smoking status: Former Smoker     Packs/day: 0.25 Types: Cigarettes     Quit date: 1970     Years since quittin.0    Smokeless tobacco: Never Used    Tobacco comment: quit 39 years ago   Vaping Use    Vaping Use: Never used   Substance and Sexual Activity    Alcohol use: No    Drug use: No    Sexual activity: Not on file   Other Topics Concern    Not on file   Social History Narrative    Not on file     Social Determinants of Health     Financial Resource Strain:     Difficulty of Paying Living Expenses: Not on file   Food Insecurity:     Worried About Running Out of Food in the Last Year: Not on file    Alysa of Food in the Last Year: Not on file   Transportation Needs:     Lack of Transportation (Medical): Not on file    Lack of Transportation (Non-Medical):  Not on file   Physical Activity:     Days of Exercise per Week: Not on file    Minutes of Exercise per Session: Not on file   Stress:     Feeling of Stress : Not on file   Social Connections:     Frequency of Communication with Friends and Family: Not on file    Frequency of Social Gatherings with Friends and Family: Not on file    Attends Sabianism Services: Not on file    Active Member of [address] [place] [address] or Organizations: Not on file    Attends Club or Organization Meetings: Not on file    Marital Status: Not on file   Intimate Partner Violence:     Fear of Current or Ex-Partner: Not on file    Emotionally Abused: Not on file    Physically Abused: Not on file    Sexually Abused: Not on file   Housing Stability:     Unable to Pay for Housing in the Last Year: Not on file    Number of [place] in the Last Year: Not on file    Unstable Housing in the Last Year: Not on file     Family History   Problem Relation Age of Onset    Stroke Mother     Arthritis Father     Cancer Sister         ovarian    Cancer Brother         pancreatic    Diabetes Sister     Arthritis Sister     Heart Disease Sister        Home medications:    Previous Medications    ASPIRIN 81 MG EC TABLET    Take 81 mg by mouth daily    CYANOCOBALAMIN 1000 MCG/ML INJECTION    Inject 1,000 mcg into the muscle every 30 days On the 10th of each month    DIGOXIN (LANOXIN) 125 MCG TABLET    Take 125 mcg by mouth daily Hold if pulse <60    ESTRADIOL (ESTRACE) 0.1 MG/GM VAGINAL CREAM    Place 1 g vaginally daily Every Mon, Wed, Fri for recurrent UTI    FUROSEMIDE (LASIX) 20 MG TABLET    Take 20 mg by mouth daily Every Mon, Wed, Fri    HYDROCORTISONE (PREPARATION H) 1 % CREAM    Apply topically daily For malignant neoplasm of colon    METOPROLOL SUCCINATE (TOPROL XL) 25 MG EXTENDED RELEASE TABLET    Take 1 tablet by mouth 2 times daily    MULTIPLE VITAMINS-MINERALS (CENTRUM SILVER 50+WOMEN) TABS    Take 1 tablet by mouth daily    OMEPRAZOLE (PRILOSEC) 20 MG DELAYED RELEASE CAPSULE    Take 20 mg by mouth daily    POTASSIUM CHLORIDE (KLOR-CON M) 10 MEQ EXTENDED RELEASE TABLET    Take 10 mEq by mouth daily Every Mon, Wed, Fri    SIMVASTATIN (ZOCOR) 20 MG TABLET    Take 20 mg by mouth nightly.     WARFARIN (COUMADIN) 5 MG TABLET    Take 2.5 mg every Mon, Wed, Fri, Sat and 3 mg Tues, Thurs, Sun  [facility] by Provider at [facility]:  Scheduled Meds:   Tetanus-Diphth-Acell Pertussis  0.5 mL IntraMUSCular Once    metoprolol  5 mg IntraVENous Once     Continuous Infusions:  PRN Meds:  Objective   ED TRIAGE VITALS  BP: (!) 148/96, Temp: 98.4 °F (36.9 °C), Pulse: 109, Resp: 17, SpO2: 92 %  [place] Coma Scale  Eye Opening: Spontaneous  Best Verbal Response: Confused (dementia)  Best Motor Response: Obeys commands  Niki Coma Scale Score: 14  Results for orders placed or performed during the hospital encounter of 05/20/22   CBC with Auto Differential   Result Value Ref Range    WBC 9.0 4.8 - 10.8 thou/mm3    RBC 3.55 (L) 4.20 - 5.40 mill/mm3    Hemoglobin 11.4 (L) 12.0 - 16.0 gm/dl    Hematocrit 36.3 (L) 37.0 - 47.0 %    .3 (H) 81.0 - 99.0 fL    MCH 32.1 26.0 - 33.0 pg    MCHC 31.4 (L) 32.2 - 35.5 gm/dl    RDW-CV 13.6 11.5 - 14.5 %    RDW-SD 51.8 (H) 35.0 - 45.0 fL    Platelets 106 564 - 109 thou/mm3    MPV 9.6 9.4 - 12.4 fL    Seg Neutrophils 77.4 %    Lymphocytes 12.2 %    Monocytes 8.7 %    Eosinophils 0.7 %    Basophils 0.3 %    Immature Granulocytes 0.7 %    Segs Absolute 7.0 1.8 - 7.7 thou/mm3    Lymphocytes Absolute 1.1 1.0 - 4.8 thou/mm3    Monocytes Absolute 0.8 0.4 - 1.3 thou/mm3    Eosinophils Absolute 0.1 0.0 - 0.4 thou/mm3    Basophils Absolute 0.0 0.0 - 0.1 thou/mm3    Immature Grans (Abs) 0.06 0.00 - 0.07 thou/mm3    nRBC 0 /100 wbc   Comprehensive Metabolic Panel   Result Value Ref Range    Glucose 110 (H) 70 - 108 mg/dL    CREATININE 0.6 0.4 - 1.2 mg/dL    BUN 16 7 - 22 mg/dL    Sodium 139 135 - 145 meq/L    Potassium 4.3 3.5 - 5.2 meq/L    Chloride 104 98 - 111 meq/L    CO2 27 23 - 33 meq/L    Calcium 8.9 8.5 - 10.5 mg/dL    AST 20 5 - 40 U/L    Alkaline Phosphatase 71 38 - 126 U/L    Total Protein 7.2 6.1 - 8.0 g/dL    Albumin 4.2 3.5 - 5.1 g/dL    Total Bilirubin 1.0 0.3 - 1.2 mg/dL    ALT 14 11 - 66 U/L   APTT   Result Value Ref Range    aPTT 30.8 22.0 - 38.0 seconds   Protime-INR   Result Value Ref Range    INR 1.30 (H) 0.85 - 1.13   Ethanol   Result Value Ref Range    ETHYL ALCOHOL, SERUM < 0.01 0.00 %   Magnesium   Result Value Ref Range    Magnesium 1.9 1.6 - 2.4 mg/dL   Anion Gap   Result Value Ref Range    Anion Gap 8.0 8.0 - 16.0 meq/L   Glomerular Filtration Rate, Estimated   Result Value Ref Range    Est, Glom Filt Rate >90 ml/min/1.73m2   Osmolality   Result Value Ref Range    Osmolality Calc 279.4 275.0 - 300.0 mOsmol/kg   POCT Glucose   Result Value Ref Range    POC Glucose 104 70 - 108 mg/dl       Physical Exam:  Patient Vitals for the past 24 hrs:   BP Temp Pulse Resp SpO2 Height Weight   05/20/22 0832 (!) 148/96 -- 109 17 92 % -- --   05/20/22 0804 (!) 155/90 -- 120 16 93 % -- --   05/20/22 0751 133/82 -- 115 20 91 % -- --   05/20/22 0743 (!) 146/89 -- 105 23 92 % -- --   05/20/22 0730 -- -- -- -- -- 5' 5\" (1.651 m) 130 lb (59 kg)   05/20/22 0729 (!) 147/65 98.4 °F (36.9 °C) 96 19 94 % -- --     Primary Assessment:  Airway: Patent, trachea midline  Breathing: Breath sounds present and equal bilaterally, spontaneous, and unlabored  Circulation: Hemodynamically stable, 2+ central and peripheral pulses. Disability: FRANCO x 4, following commands. GCS =14    Secondary Assessment:  General: Alert, NAD. Head: Normocephalic, mid face stable. Hematoma and 1cm laceration to the left occipital scalp with bleeding controlled. 2 staples placed for closure. Tympanic membranes intact. Nares patent bilaterally, no epistaxis. Mouth clear of foreign bodies, no lacerations or abrasions. Eyes: PERRLA. EOMI. Nontraumatic. Neurologic: A & O x2. Following commands. CN 2-12 intact  Neck: Immobilized in cervical collar, trachea midline. Cervical spines NTTP midline, without step-offs, crepitus or deformity. Back:TL spines are NTTP midline, without step-offs, crepitus or deformity. No abrasions, contusions, or ecchymosis noted. Lungs: Clear to auscultation bilaterally. Chest Wall: Chest rise symmetrical.  Chest wall without tenderness to palpation. No crepitus, deformities, lacerations, or abrasions. Heart: RRR. Normal S1/S2. No obvious M/G/R. Abdomen:  Soft, NTTP. No guarding. Non-peritoneal.  Pelvis:  NTTP, stable to compression. Femoral pulses 2+. GI/: No blood at the urinary meatus. No gross hematuria. Extremities: No gross deformities. PMS intact. Radial /DP/PT pulses 2+ bilaterally. Right shoulder is tender to palpation but there are no deformities or ecchymosis. Limited range of motion due to tenderness. Chronic bilateral lower extremity discoloration from knees distal due to chronic venous insufficiency. Skin: Skin warm and dry. Normal for ethnicity. Radiology:     CT HEAD WO CONTRAST   Final Result   1. No mass effect or acute hemorrhage.    2. Chronic periventricular small vessel ischemic changes and cerebral atrophy. **This report has been created using voice recognition software. It may contain minor errors which are inherent in voice recognition technology. **      Final report electronically signed by Dr. Jamey García on 5/20/2022 8:18 AM      CT CERVICAL SPINE WO CONTRAST   Final Result   1. No acute fracture or spondylolisthesis of the cervical spine. 2. Multilevel degenerative disc disease, neural foraminal narrowing and central canal stenosis. 3. Compression deformities of the T4 and T5 vertebra, likely chronic. Final report electronically signed by Dr. Jamey García on 5/20/2022 8:20 AM      XR CHEST PORTABLE   Final Result   Cardiomegaly. Mild pulmonary vascular cephalization. **This report has been created using voice recognition software. It may contain minor errors which are inherent in voice recognition technology. **      Final report electronically signed by Dr. Paul Lockwood on 5/20/2022 8:04 AM      XR SHOULDER RIGHT (MIN 2 VIEWS)   Final Result   No acute process identified. Limited exam.            **This report has been created using voice recognition software. It may contain minor errors which are inherent in voice recognition technology. **      Final report electronically signed by Dr. Paul Lockwood on 5/20/2022 8:01 AM      XR PELVIS (1-2 VIEWS)   Final Result   No gross fracture. If clinical concern remains for fracture CT would be recommended            **This report has been created using voice recognition software. It may contain minor errors which are inherent in voice recognition technology. **      Final report electronically signed by Dr. Paul Lockwood on 5/20/2022 8:00 AM      US Ed Fast Abdomen Limited    (Results Pending)     Fast Exam: Yes - negative for free fluid    Electronically signed by CATHY Tejada CNP on 5/20/2022 at 9:07 AM

## 2022-05-20 NOTE — ED NOTES
Dr. Sonia Franco to close laceration on back of head with x2 staples at this time.       Thelma Kenney RN  05/20/22 0182

## 2022-05-20 NOTE — ED PROVIDER NOTES
[address] [address]          Pt Name: Taiwo Winters  MRN: [id number]  [place] 1935  Date of evaluation: 5/20/2022  Treating Resident Physician: Teodoro Kumari MD  Supervising Physician: Dr Chandler Solis       Chief Complaint   Patient presents with   Walter Balint    Trauma     History obtained from ems. HISTORY OF PRESENT ILLNESS    HPI  Kristi Armstrong is a 82 y.o. female with past medical history of CVA, atrial fibrillation on Eliquis who presents to the emergency department for evaluation of fall and head injury nursing facility with extensive scalp bleeding. She was brought in by EMS has a baseline dementia and per nursing home is at her approximate baseline which is intermittently verbal with confused speech. She is also complaining of right shoulder pain. The patient has no other acute complaints at this time. REVIEW OF SYSTEMS   Review of Systems   Constitutional: Negative for chills and fever. HENT: Negative for rhinorrhea, sinus pain and sore throat. Scalp laceration   Eyes: Negative for redness. Respiratory: Negative for cough and shortness of breath. Cardiovascular: Negative for chest pain. Gastrointestinal: Negative for abdominal pain, diarrhea, nausea and vomiting. Genitourinary: Negative for dysuria. Musculoskeletal: Negative for back pain. Skin: Positive for wound. Negative for rash. Neurological: Positive for headaches. Negative for light-headedness. Psychiatric/Behavioral: Negative for agitation. PAST MEDICAL AND SURGICAL HISTORY     Past Medical History:   Diagnosis Date    AR (allergic rhinitis)     [facility]     Cancer ([facility]     skin    Cancer ([facility] 2015    colon  (surgery - no chemo or radiation)    GERD (gastroesophageal reflux disease)     History of blood transfusion at age 38 years    before hysterectomy    Hx of blood clots 2006? ? brain    Hyperlipidemia     Hypertension     MI, old     Unspecified cerebral artery occlusion with cerebral infarction 2006    no weakness - left eye deficit (vision)     Past Surgical History:   Procedure Laterality Date    APPENDECTOMY      at age 23 years? ?    CARDIAC CATHETERIZATION  2006??    x2,  OK    COLECTOMY  03/26/2015    Robotic Assisted Laparoscopic--Dr. Naomie Liu    COLONOSCOPY  2016??    DENTAL SURGERY  around age 30 Years? ??    full mouth extraction    DILATION AND CURETTAGE OF UTERUS      before hysterectomy    ENDOSCOPY, COLON, DIAGNOSTIC      EYE SURGERY  2010? ??    cataracts, bilateral    HYSTERECTOMY      at age 38 years    MOHS SURGERY  07/14/2017    nose    PRE-MALIGNANT / BENIGN SKIN LESION EXCISION  07/14/2017    chest    SKIN BIOPSY           MEDICATIONS   No current facility-administered medications for this encounter.     Current Outpatient Medications:     amoxicillin-clavulanate (AUGMENTIN) 875-125 MG per tablet, Take 1 tablet by mouth 2 times daily for 7 days, Disp: 14 tablet, Rfl: 0    metoprolol succinate (TOPROL XL) 25 MG extended release tablet, Take 1 tablet by mouth 2 times daily, Disp: 30 tablet, Rfl: 3    warfarin (COUMADIN) 5 MG tablet, Take 2.5 mg every Mon, Wed, Fri, Sat and 3 mg Tues, Thurs, Sun Dosed by Provider at [facility], Disp: , Rfl:     Multiple Vitamins-Minerals (CENTRUM SILVER 50+WOMEN) TABS, Take 1 tablet by mouth daily, Disp: , Rfl:     digoxin (LANOXIN) 125 MCG tablet, Take 125 mcg by mouth daily Hold if pulse <60, Disp: , Rfl:     aspirin 81 MG EC tablet, Take 81 mg by mouth daily, Disp: , Rfl:     estradiol (ESTRACE) 0.1 MG/GM vaginal cream, Place 1 g vaginally daily Every Mon, Wed, Fri for recurrent UTI, Disp: , Rfl:     furosemide (LASIX) 20 MG tablet, Take 20 mg by mouth daily Every Mon, Wed, Fri, Disp: , Rfl:     omeprazole (PRILOSEC) 20 MG delayed release capsule, Take 20 mg by mouth daily, Disp: , Rfl:    potassium chloride (KLOR-CON M) 10 MEQ extended release tablet, Take 10 mEq by mouth daily Every Mon, Wed, Fri, Disp: , Rfl:     hydrocortisone (PREPARATION H) 1 % cream, Apply topically daily For malignant neoplasm of colon, Disp: , Rfl:     cyanocobalamin 1000 MCG/ML injection, Inject 1,000 mcg into the muscle every 30 days On the 10th of each month, Disp: , Rfl:     simvastatin (ZOCOR) 20 MG tablet, Take 20 mg by mouth nightly., Disp: , Rfl:       SOCIAL HISTORY     Social History     Social History Narrative    Not on file     Social History     Tobacco Use    Smoking status: Former Smoker     Packs/day: 0.25     Types: Cigarettes     Quit date: 1970     Years since quittin.0    Smokeless tobacco: Never Used    Tobacco comment: quit 39 years ago   Vaping Use    Vaping Use: Never used   Substance Use Topics    Alcohol use: No    Drug use: No         ALLERGIES     Allergies   Allergen Reactions    Latex Rash    Ciprofloxacin Itching and Rash    Codeine Nausea And Vomiting         FAMILY HISTORY     Family History   Problem Relation Age of Onset    Stroke Mother     Arthritis Father     Cancer Sister         ovarian    Cancer Brother         pancreatic    Diabetes Sister     Arthritis Sister     Heart Disease Sister          PREVIOUS RECORDS   Previous records reviewed: Patient was seen on 10/12/2021 for episode of unresponsiveness. PHYSICAL EXAM     ED Triage Vitals   BP Temp Temp src Pulse Resp SpO2 Height Weight   22 0729 22 0729 -- 22 0729 22 0729 22 0729 22 0730 22 0730   (!) 147/65 98.4 °F (36.9 °C)  96 19 94 % 5' 5\" (1.651 m) 130 lb (59 kg)     Initial vital signs and nursing assessment reviewed and normal. Pulsoximetry is normal per my interpretation. Additional Vital Signs:  Vitals:    22 1045   BP: (!) 139/100   Pulse: 98   Resp: 19   Temp:    SpO2: 93%         Physical Exam  Vitals and nursing note reviewed. Constitutional:       General: She is in acute distress. HENT:      Head:      Comments: Left occipital lobe hematoma with 1 cm laceration noted. No significant active bleeding at this time. Right Ear: Tympanic membrane, ear canal and external ear normal.      Left Ear: Tympanic membrane, ear canal and external ear normal.      Nose: Nose normal.      Mouth/Throat:      Mouth: Mucous membranes are moist.      Pharynx: Oropharynx is clear. Eyes:      General: No scleral icterus. Conjunctiva/sclera: Conjunctivae normal.      Pupils: Pupils are equal, round, and reactive to light. Neck:      Comments: Patient was placed in a c-collar immediately upon arrival.  Cardiovascular:      Rate and Rhythm: Tachycardia present. Rhythm irregular. Pulses: Normal pulses. Heart sounds: Normal heart sounds. Comments: Radial pulses 2+ bilaterally, DP pulses 2+ bilaterally  Pulmonary:      Effort: Pulmonary effort is normal. No respiratory distress. Breath sounds: Normal breath sounds. No wheezing. Comments: No chest wall deformity no chest wall contusion no chest wall ecchymosis  Chest:      Chest wall: No tenderness. Abdominal:      General: Abdomen is flat. There is no distension. Palpations: Abdomen is soft. Tenderness: There is no abdominal tenderness. There is no guarding or rebound. Comments: No abdominal bruising, pelvis stable   Musculoskeletal:      Cervical back: Neck supple. Tenderness present. No rigidity. No muscular tenderness. Comments: Tenderness to palpation over the right humeral neck, limited range of motion secondary to pain. Lymphadenopathy:      Cervical: No cervical adenopathy. Skin:     General: Skin is warm and dry. Capillary Refill: Capillary refill takes less than 2 seconds. Coloration: Skin is not jaundiced. Neurological:      Mental Status: She is alert. GCS: GCS eye subscore is 4. GCS verbal subscore is 5.  GCS motor and follow-up with primary care physician be discharged back to nursing facility patient able to ambulate at baseline. Has no acute findings on her CT imaging. ED RESULTS   Laboratory results:  Labs Reviewed   CBC WITH AUTO DIFFERENTIAL - Abnormal; Notable for the following components:       Result Value    RBC 3.55 (*)     Hemoglobin 11.4 (*)     Hematocrit 36.3 (*)     .3 (*)     MCHC 31.4 (*)     RDW-SD 51.8 (*)     All other components within normal limits   COMPREHENSIVE METABOLIC PANEL - Abnormal; Notable for the following components:    Glucose 110 (*)     All other components within normal limits   PROTIME-INR - Abnormal; Notable for the following components:    INR 1.30 (*)     All other components within normal limits   URINE WITH REFLEXED MICRO - Abnormal; Notable for the following components:    Blood, Urine TRACE (*)     Protein, UA TRACE (*)     Nitrite, Urine POSITIVE (*)     Leukocyte Esterase, Urine SMALL (*)     All other components within normal limits   CULTURE, REFLEXED, URINE    Narrative:     Source: urine, clean catch       Site:           Current Antibiotics: not stated   APTT   ETHANOL   TROPONIN   TSH WITH REFLEX   MAGNESIUM   ANION GAP   GLOMERULAR FILTRATION RATE, ESTIMATED   OSMOLALITY   URINE DRUG SCREEN   POCT GLUCOSE       Radiologic studies results:  CT HEAD WO CONTRAST   Final Result   1. No mass effect or acute hemorrhage. 2. Chronic periventricular small vessel ischemic changes and cerebral atrophy. **This report has been created using voice recognition software. It may contain minor errors which are inherent in voice recognition technology. **      Final report electronically signed by Dr. Sharda Jovel on 5/20/2022 8:18 AM      CT CERVICAL SPINE WO CONTRAST   Final Result   1. No acute fracture or spondylolisthesis of the cervical spine. 2. Multilevel degenerative disc disease, neural foraminal narrowing and central canal stenosis.    3. Compression deformities of the T4 and T5 vertebra, likely chronic. Final report electronically signed by Dr. Winifred Llanos on 5/20/2022 8:20 AM      XR CHEST PORTABLE   Final Result   Cardiomegaly. Mild pulmonary vascular cephalization. **This report has been created using voice recognition software. It may contain minor errors which are inherent in voice recognition technology. **      Final report electronically signed by Dr. Prasanth De Leon on 5/20/2022 8:04 AM      XR SHOULDER RIGHT (MIN 2 VIEWS)   Final Result   No acute process identified. Limited exam.            **This report has been created using voice recognition software. It may contain minor errors which are inherent in voice recognition technology. **      Final report electronically signed by Dr. Prasanth De Leon on 5/20/2022 8:01 AM      XR PELVIS (1-2 VIEWS)   Final Result   No gross fracture. If clinical concern remains for fracture CT would be recommended            **This report has been created using voice recognition software. It may contain minor errors which are inherent in voice recognition technology. **      Final report electronically signed by Dr. Prasanth De Leon on 5/20/2022 8:00 AM      US Ed Fast Abdomen Limited    (Results Pending)       ED Medications administered this visit:   Medications   0.9 % sodium chloride bolus (0 mLs IntraVENous Stopped 5/20/22 0900)   fentaNYL (SUBLIMAZE) injection 25 mcg (25 mcg IntraVENous Given 5/20/22 0733)   Tetanus-Diphth-Acell Pertussis (BOOSTRIX) injection 0.5 mL (0.5 mLs IntraMUSCular Given 5/20/22 0923)   fentaNYL (SUBLIMAZE) injection 25 mcg (25 mcg IntraVENous Given 5/20/22 0740)   metoprolol (LOPRESSOR) injection 5 mg (5 mg IntraVENous Given 5/20/22 0941)         ED COURSE     ED Course as of 05/20/22 1140   Fri May 20, 2022   0847 CT CERVICAL SPINE WO CONTRAST  \"  IMPRESSION:  1. No acute fracture or spondylolisthesis of the cervical spine.   2. Multilevel degenerative disc disease, neural foraminal narrowing and central canal stenosis. 3. Compression deformities of the T4 and T5 vertebra, likely chronic. \" [AL]   0848 CT HEAD WO CONTRAST     IMPRESSION:  1. No mass effect or acute hemorrhage. 2. Chronic periventricular small vessel ischemic changes and cerebral atrophy.   \" [AL]   0848 XR CHEST PORTABLE  \"IMPRESSION:  Cardiomegaly. Mild pulmonary vascular cephalization.      \" [AL]   0848 XR SHOULDER RIGHT (MIN 2 VIEWS)  \"  IMPRESSION:  No acute process identified. Limited exam.   \" [AL]   0848 XR PELVIS (1-2 VIEWS)  \"IMPRESSION:  No gross fracture. If clinical concern remains for fracture CT would be recommended   \" [AL]   2208 WBC: 9.0 [AL]   0849 Hemoglobin Quant(!): 11.4  Slightly decreased from her baseline   [AL]   0849 Patients daughter advised that she normally walks with a walker [AL]   0849 INR(!): 1.30 [AL]   0858 Pulse: 109 [AL]   0957 Patient is currently pending urinalysis at this time. [AL]   1136 Nitrite, Urine(!): POSITIVE [AL]   1136 WBC, UA: 50-75 [AL]   1136 Bacteria, UA: MANY [AL]   9496 Patient will be treated for her urinary tract infection she will be discharged back to her nursing home facility. [AL]      ED Course User Index  [AL] Kelsie Lucas MD     Strict return precautions and follow up instructions were discussed with the patient prior to discharge, with which the patient agrees. MEDICATION CHANGES     New Prescriptions    AMOXICILLIN-CLAVULANATE (AUGMENTIN) 875-125 MG PER TABLET    Take 1 tablet by mouth 2 times daily for 7 days         FINAL DISPOSITION     Final diagnoses:   Fall, initial encounter   Laceration of scalp, initial encounter   Acute UTI     Condition: condition: good  Dispo: Discharge to home      This transcription was electronically signed. Parts of this transcriptions may have been dictated by use of voice recognition software and electronically transcribed, and parts may have been transcribed with the assistance of an ED scribe.  The transcription may contain errors not detected in proofreading. Please refer to my supervising physician's documentation if my documentation differs.     Electronically Signed: Kiesha Penaloza MD, 05/20/22, 11:40 AM         Kiesha Penaloza MD  Resident  05/20/22 8707

## 2022-05-23 LAB
ORGANISM: ABNORMAL
ORGANISM: ABNORMAL
URINE CULTURE REFLEX: ABNORMAL
URINE CULTURE REFLEX: ABNORMAL
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
## 2024-10-18 NOTE — PROGRESS NOTES
Hospitalist Progress Note      Patient:  Lorrie Mccullough    Unit/Bed:4A-16/016-A  YOB: 1935  MRN: [id number]   Acct: [de-identified]     PCP: Julia Toney MD  Date of Admission: 10/12/2021     Date of Service: Pt seen/examined on 10/13/21  and Admitted to Inpatient with expected LOS greater than two midnights due to medical therapy. Chief Complaint: Seizure-like activity    Assessment and Plan:    1.)  Acute metabolic encephalopathy - likely secondary to UTI : r/o seizures Listed staring spells with unresponsiveness lasting approximately a minute each. CT head and CTA head and neck are negative for acute pathology. Although acutely this could be a presentation of UTI, the patient's daughter does state that her mother has had these episodes for months. Mentation improved prior to admission.  -Neurology consulted, seeing patient  -EEG performed on 10/13/2021  -Patient may be discharged prior to results being released  -Seizure and fall precautions in place  -Neurochecks every 4 hours  -Treat UTI as below    2.)  UTI: Positive UA on admission, patient also reports issues of urinating more frequently despite no increased intake of volume. Received IV Rocephin in ED. Afebrile with no leukocytosis. -Continue Rocephin until cultures result  -Preliminary result shows gram-negative enteric bacilli    3.)  Persistent A. fib: [id number] score = 5. Patient takes Coumadin outpatient 2.5 mg daily. She is rate controlled with metoprolol and digoxin. Follows Coumadin clinic outpatient. -Maintain telemetry  -Continue with metoprolol and digoxin  -Patient having episodes of tachycardia and bradycardia, however asymptomatic, continue to monitor    4.)  Hx of CAD: Last heart cath in 2015 without PCI. Per patient's family a few months afterwards she developed a severe CVA. History unclear as per chart. Patient denies any active chest pain, troponin is negative, and EKG is without ischemic changes. Continue to monitor. 5.)  Hx of primary hypertension: BP stable on admission, continue with patient's outpatient medication of metoprolol. 6.)  Hypercholesteremia: Patient takes Lipitor outpatient, continue inpatient. 7.)  Alzheimer's dementia/vascular dementia: Patient is alert and oriented only to person but not to place or time. Requires frequent reorientation. Her daughter is in the room with her during most of the day. As stated by her daughter, the patient has become combative and agitated which is outside of the home. Patient does not take cognitive enhancing medications.  -Begin melatonin to keep circadian rhythm  -Swallow assessment completed, patient is allowed to eat  -Minimize auditory stimulation  -Limit disturbances during nighttime except for hourly rounding  -Ambulate patient, keep patient up in chair for most of day especially during meals  -PT/OT/SLP consulted    Disposition Plan: Complete EEG and switch patient to oral antibiotics prior to discharge. Return to nursing home. History Of Present Illness:      Ms. Cynthia Lin is an 72-year-old female with a past medical history of Alzheimer's dementia, vascular dementia, persistent A. fib, previous colon cancer 8715 treated surgically, hypertension, hyperlipidemia. Patient currently resides in a nursing home. Patient was admitted to the ED after episode today where she was talking by other women in her nursing home when she stared off into space. During this episode she was nonresponsive to verbal commands. Her daughter was in the room with her at the time stated that her eyes looked normal.  EMS was contacted immediately brought the patient in for evaluation. On arrival to the ER, the patient was initially unresponsive and not making any purposeful movements. No seizure-like activity was noted by the staff. There was no history of bowel or bladder incontinence with this episode.   The patient does not have a listed history of seizure disorder however her daughter states that she has episodes in the past where she would zone out or staring to space. Patient did have a UTI noted at the nursing which treatment was delayed for due to awaiting culture results. CT of the head showed no acute mass-effect or hemorrhage, CTA of the head and neck revealed no aneurysm or dissection. Neurology was consulted and recommended an EEG. The patient's mental status improved prior to being admitted. She became more alert and responsive. The patient was able to say that she has been urinating more frequently lately. She denied any other symptoms on arrival including chest pain, nausea, vomiting, diarrhea, dizziness, syncope, or pain with urination. 10/13: Patient states no acute events overnight. She is alert and oriented to person but not to place or time. She is frequently redirected in the room with her daughter. She denies any chest pain, shortness of breath, nausea, vomiting, diarrhea, or pain in her legs. Her daughter states that her mother appears very alert and oriented today as opposed to a few weeks prior. She states that she has not seen her mom be so active. Goals and plan of care were addressed with the patient, and neurology was in to see the patient this morning and recommended EEG. Past Medical History, Past Surgical History, Allergies, Medications, Social History, Family History reviewed in H&P and remain unchanged from admission. Diet:  Diet NPO Exceptions are: Sips of Water with Meds    Review of Systems:   Pertinent positives as noted in the HPI. All other systems reviewed and negative. Physical exam:    BP (!) 144/101   Pulse 100   Temp 97.3 °F (36.3 °C) (Oral)   Resp 16   Ht 5' 5\" (1.651 m)   Wt 145 lb 3.2 oz (65.9 kg)   SpO2 98%   BMI 24.16 kg/m²     General appearance:  No apparent distress, appears stated age and cooperative. HEENT:  Normal cephalic, atraumatic without obvious deformity.  Pupils equal, round, and reactive to light. Extra ocular muscles intact. Conjunctivae/corneas clear. Neck: Supple, with full range of motion. No jugular venous distention. Trachea midline. Respiratory:  Normal respiratory effort. Clear to auscultation, bilaterally without Rales/Wheezes/Rhonchi. Cardiovascular:  Regular rate and rhythm with normal S1/S2 without murmurs, rubs or gallops. Abdomen: Soft, non-tender, non-distended with normal bowel sounds. Musculoskeletal:  No clubbing or cyanosis, +1 pitting edema in the lower limbs bilaterally. Full range of motion without deformity. Skin: Skin color, texture, turgor normal.  No rashes or lesions. Neurologic:  Neurovascularly intact without any focal sensory/motor deficits. Cranial nerves: II-XII intact, grossly non-focal.  Psychiatric:  Alert and oriented to person but not place or time, thought content appropriate, normal insight  Capillary Refill: Brisk,< 3 seconds   Peripheral Pulses: +2 palpable, equal bilaterally     Labs:     Recent Labs     10/12/21  2118 10/13/21  0422   WBC 7.4 6.7   HGB 12.9 12.5   HCT 41.0 39.9    148     Recent Labs     10/12/21  2118 10/13/21  0422    143   K 4.1 4.2    105   CO2 26 28   BUN 18 15   CREATININE 0.9 0.7   CALCIUM 9.2 9.0     Recent Labs     10/12/21  2118 10/13/21  0422   AST 19 22   ALT 13 15   BILITOT 0.5 0.6   ALKPHOS 89 85     Recent Labs     10/12/21  2118   INR 3.13*     No results for input(s): No Justin in the last 72 hours. Urinalysis:      Lab Results   Component Value Date    NITRU POSITIVE 10/12/2021    WBCUA 15-25 10/12/2021    BACTERIA FEW 10/12/2021    RBCUA 3-5 10/12/2021    BLOODU SMALL 10/12/2021    GLUCOSEU NEGATIVE 10/12/2021       Intake & Output:  I/O last 3 completed shifts: In: 10 [P.O.:10]  Out: -   No intake/output data recorded. Radiology:     No new imaging to report. XR CHEST PORTABLE   Final Result   1. No acute intrathoracic process.    2. Mild cardiomegaly. **This report has been created using voice recognition software. It may contain minor errors which are inherent in voice recognition technology. **      Final report electronically signed by Dr. Kelsey Christiansen on 10/12/2021 9:55 PM      CTA [address] (CODE STROKE)         CTA HEAD W WO CONTRAST (CODE STROKE)         CT HEAD WO CONTRAST   Final Result   1. No mass effect or acute hemorrhage. 2. Chronic periventricular small vessel ischemic changes and cerebral atrophy. 3. Right-sided encephalomalacia, likely secondary to prior infarct. If there is clinical concern for an evolving infarct, brain MRI is recommended for further evaluation. **This report has been created using voice recognition software. It may contain minor errors which are inherent in voice recognition technology. **      Final report electronically signed by Dr. Kelsey Christiansen on 10/12/2021 9:12 PM           DVT prophylaxis: Already on Anticoagulation warfarin    Code Status: DNR-CC    PT/OT Eval Status: [address] [place] [address] Problems    Diagnosis Date Noted    AMS (altered mental status) [R41.82] 10/13/2021       Thank you Shahzad Rebollar MD for the opportunity to be involved in this patient's care.     Electronically signed by Chelly Razo DO on 10/13/2021 at 7:53 AM No